# Patient Record
Sex: MALE | Race: WHITE | HISPANIC OR LATINO
[De-identification: names, ages, dates, MRNs, and addresses within clinical notes are randomized per-mention and may not be internally consistent; named-entity substitution may affect disease eponyms.]

---

## 2020-09-28 ENCOUNTER — APPOINTMENT (OUTPATIENT)
Dept: NEUROSURGERY | Facility: CLINIC | Age: 30
End: 2020-09-28
Payer: MEDICAID

## 2020-09-28 VITALS — TEMPERATURE: 98.2 F

## 2020-09-28 DIAGNOSIS — S02.91XA UNSPECIFIED FRACTURE OF SKULL, INITIAL ENCOUNTER FOR CLOSED FRACTURE: ICD-10-CM

## 2020-09-28 PROCEDURE — 99203 OFFICE O/P NEW LOW 30 MIN: CPT
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
## 2020-09-30 NOTE — PHYSICAL EXAM
[General Appearance - Alert] : alert [General Appearance - In No Acute Distress] : in no acute distress [Oriented To Time, Place, And Person] : oriented to person, place, and time [Impaired Insight] : insight and judgment were intact [Affect] : the affect was normal [Paraparesis (Lower Extremities)] : paraparesis in both legs [Non-ambulatory] : Non-ambulatory [Sclera] : the sclera and conjunctiva were normal [Outer Ear] : the ears and nose were normal in appearance [Hearing Threshold Finger Rub Not Wheeler] : hearing was normal [Neck Appearance] : the appearance of the neck was normal [Abdomen Tenderness] : non-tender [Abdomen Mass (___ Cm)] : no abdominal mass palpated [No Spinal Tenderness] : no spinal tenderness [Nail Clubbing] : no clubbing  or cyanosis of the fingernails [Involuntary Movements] : no involuntary movements were seen [Tremor] : no tremor present [FreeTextEntry6] : (+) RUE spasticity [FreeTextEntry1] : G tube in place
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
## 2020-12-02 ENCOUNTER — APPOINTMENT (OUTPATIENT)
Dept: CT IMAGING | Facility: CLINIC | Age: 30
End: 2020-12-02
Payer: MEDICAID

## 2020-12-02 ENCOUNTER — OUTPATIENT (OUTPATIENT)
Dept: OUTPATIENT SERVICES | Facility: HOSPITAL | Age: 30
LOS: 1 days | End: 2020-12-02
Payer: COMMERCIAL

## 2020-12-02 DIAGNOSIS — S06.5X9A TRAUMATIC SUBDURAL HEMORRHAGE WITH LOSS OF CONSCIOUSNESS OF UNSPECIFIED DURATION, INITIAL ENCOUNTER: ICD-10-CM

## 2020-12-02 PROCEDURE — 70450 CT HEAD/BRAIN W/O DYE: CPT | Mod: 26

## 2020-12-02 PROCEDURE — 70450 CT HEAD/BRAIN W/O DYE: CPT

## 2020-12-16 DIAGNOSIS — S06.5X9A TRAUMATIC SUBDURAL HEMORRHAGE WITH LOSS OF CONSCIOUSNESS OF UNSPECIFIED DURATION, INITIAL ENCOUNTER: ICD-10-CM

## 2021-02-04 ENCOUNTER — APPOINTMENT (OUTPATIENT)
Dept: PHYSICAL MEDICINE AND REHAB | Facility: CLINIC | Age: 31
End: 2021-02-04
Payer: MEDICAID

## 2021-02-04 VITALS
OXYGEN SATURATION: 100 % | DIASTOLIC BLOOD PRESSURE: 73 MMHG | HEIGHT: 69 IN | WEIGHT: 185 LBS | TEMPERATURE: 98.2 F | SYSTOLIC BLOOD PRESSURE: 122 MMHG | BODY MASS INDEX: 27.4 KG/M2 | HEART RATE: 71 BPM

## 2021-02-04 PROBLEM — S06.5X9A SUBDURAL HEMATOMA, POST-TRAUMATIC: Status: ACTIVE | Noted: 2020-09-28

## 2021-02-04 PROCEDURE — 99072 ADDL SUPL MATRL&STAF TM PHE: CPT

## 2021-02-04 PROCEDURE — 99205 OFFICE O/P NEW HI 60 MIN: CPT

## 2021-02-09 RX ORDER — ONABOTULINUMTOXINA 100 [USP'U]/1
100 INJECTION, POWDER, LYOPHILIZED, FOR SOLUTION INTRADERMAL; INTRAMUSCULAR
Qty: 3 | Refills: 0 | Status: ACTIVE | OUTPATIENT
Start: 2021-02-09

## 2021-02-25 ENCOUNTER — APPOINTMENT (OUTPATIENT)
Dept: PHYSICAL MEDICINE AND REHAB | Facility: CLINIC | Age: 31
End: 2021-02-25
Payer: MEDICAID

## 2021-02-25 VITALS
SYSTOLIC BLOOD PRESSURE: 122 MMHG | WEIGHT: 185 LBS | OXYGEN SATURATION: 99 % | HEIGHT: 69 IN | BODY MASS INDEX: 27.4 KG/M2 | TEMPERATURE: 99.5 F | HEART RATE: 74 BPM | DIASTOLIC BLOOD PRESSURE: 77 MMHG

## 2021-02-25 PROCEDURE — 95874 GUIDE NERV DESTR NEEDLE EMG: CPT

## 2021-02-25 PROCEDURE — 99072 ADDL SUPL MATRL&STAF TM PHE: CPT

## 2021-02-25 PROCEDURE — 64643 CHEMODENERV 1 EXTREM 1-4 EA: CPT

## 2021-02-25 PROCEDURE — 64644 CHEMODENERV 1 EXTREM 5/> MUS: CPT

## 2021-02-28 NOTE — PROCEDURE
[Consent] : consent was given by patient or guardian [Site Verification] : the injection site was verified [Post-Injection Instructions Provided] : post-injection instructions were provided [] : EMG Guidance was used during the procedure [Total Units: ___] : [unfilled] units [Total Vials: ___] : [unfilled] vials were used [Total Waste: ___] : with [unfilled] wasted [TWNoteComboBox1] : Brachialis [TWNoteComboBox2] : 50 Units [de-identified] : Brachioradialis [de-identified] : 50 Units [de-identified] : Flexor Carpi Radialis [de-identified] : 25 Units [de-identified] : Flexor Digitorum Superficialis [de-identified] : 30 Units [de-identified] : Pronator Teres [de-identified] : 15 Units [de-identified] : Quadriceps [de-identified] : 15 Units [de-identified] : Soleus [de-identified] : 60 Units [de-identified] : Medial Gastrocnemius [de-identified] : 55 Units

## 2021-02-28 NOTE — ASSESSMENT
[FreeTextEntry1] : Botox x3- Lot # [id number]   Exp: 8/2023. \par Tolerated Botox procedure well .  Post discharge instructions provided.  \par \par Patient needs home therapy to improve gait, mobility and ADL impairments.  Face to Face completed and referral to [facility] for home care therapy initiated. \par \par Will contact OT Remedios Gleason at [facility] to get in contact with pt's sister or brother to set up appointment for  clinic.

## 2021-02-28 NOTE — PHYSICAL EXAM
[FreeTextEntry1] : Constitutional: Alert, awake, no acute distress\par HEENT: EOMI, NC/AT, neck supple\par Cardiovascular: All extremities warm and well perfused\par Pulmonary: No respiratory distress, normal chest wall expansion\par Gastrointestinal: No abdominal distention\par Extr: right KAFO\par \par Neuro:\par AAOx2,\par Recall 2/3 spontaneously, 3/3 \par Attention--able to complete days of week backward, \par Executive function--able to complete Trails [place] \par \par CN: no nystagmus, visual fields intact, EOMI, right facial weakness, sensation slightly impaired, shoulder nick impaired, tongue midline\par Motor-- right shoulder 2/5 posterior deltoid, 1/5 anterior deltoid, elbow flexion- 4/5, extension 4/5, wrist 2/5, Finger flexion 3/5 ; right LE hip 2/5, knee extensors 5/5, flexors 1/5, PF/DF 0/5. \par Left UE and LE 5/5\par Sens impaired on right\par Coordination impaired on right, \par Tone: right elbow flexors 2/4, wrist 1+/4, fingers--PIP 2/4, Quads-- 1+, PF 3/4\par \par Gait-- ambulates with Hemiwalker on left-- needs assistance of brother to advance right LE-- his brother releases lock on KAFO so right LE can advance with assistance. Ambulates with Mod A. \par \par No pain with PROM of right shoulder\par

## 2021-02-28 NOTE — REVIEW OF SYSTEMS
[Joint Stiffness] : joint stiffness [Muscle Pain] : muscle pain [Muscle Weakness] : muscle weakness [Difficulty Walking] : difficulty walking [Negative] : Heme/Lymph
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
## 2021-03-30 ENCOUNTER — APPOINTMENT (OUTPATIENT)
Dept: PHYSICAL MEDICINE AND REHAB | Facility: CLINIC | Age: 31
End: 2021-03-30

## 2021-05-24 ENCOUNTER — APPOINTMENT (OUTPATIENT)
Dept: NEUROSURGERY | Facility: CLINIC | Age: 31
End: 2021-05-24
Payer: MEDICAID

## 2021-05-24 VITALS
DIASTOLIC BLOOD PRESSURE: 82 MMHG | BODY MASS INDEX: 29.62 KG/M2 | WEIGHT: 200 LBS | HEIGHT: 69 IN | SYSTOLIC BLOOD PRESSURE: 130 MMHG | HEART RATE: 74 BPM

## 2021-05-24 PROCEDURE — 99213 OFFICE O/P EST LOW 20 MIN: CPT

## 2021-05-24 PROCEDURE — 99072 ADDL SUPL MATRL&STAF TM PHE: CPT
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
## 2021-05-24 NOTE — PHYSICAL EXAM
[General Appearance - Alert] : alert [General Appearance - In No Acute Distress] : in no acute distress [Oriented To Time, Place, And Person] : oriented to person, place, and time [Cranial Nerves Optic (II)] : visual acuity intact bilaterally,  pupils equal round and reactive to light [Cranial Nerves Oculomotor (III)] : extraocular motion intact [Cranial Nerves Trigeminal (V)] : facial sensation intact symmetrically [Cranial Nerves Facial (VII)] : face symmetrical [Cranial Nerves Vestibulocochlear (VIII)] : hearing was intact bilaterally [Cranial Nerves Accessory (XI - Cranial And Spinal)] : head turning and shoulder shrug symmetric [Cranial Nerves Hypoglossal (XII)] : there was no tongue deviation with protrusion [Sensation Tactile Decrease] : light touch was intact [FreeTextEntry5] : RUE/RLE 5/5, moving LUE/LLE, spastic
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
## 2021-05-27 ENCOUNTER — APPOINTMENT (OUTPATIENT)
Dept: PHYSICAL MEDICINE AND REHAB | Facility: CLINIC | Age: 31
End: 2021-05-27
Payer: MEDICAID

## 2021-05-27 VITALS
WEIGHT: 200 LBS | BODY MASS INDEX: 29.62 KG/M2 | SYSTOLIC BLOOD PRESSURE: 138 MMHG | HEIGHT: 69 IN | DIASTOLIC BLOOD PRESSURE: 84 MMHG | HEART RATE: 80 BPM | OXYGEN SATURATION: 98 %

## 2021-05-27 PROCEDURE — 99072 ADDL SUPL MATRL&STAF TM PHE: CPT

## 2021-05-27 PROCEDURE — 64643 CHEMODENERV 1 EXTREM 1-4 EA: CPT

## 2021-05-27 PROCEDURE — 95874 GUIDE NERV DESTR NEEDLE EMG: CPT

## 2021-05-27 PROCEDURE — 64642 CHEMODENERV 1 EXTREMITY 1-4: CPT

## 2021-06-02 ENCOUNTER — OUTPATIENT (OUTPATIENT)
Dept: OUTPATIENT SERVICES | Facility: HOSPITAL | Age: 31
LOS: 1 days | End: 2021-06-02
Payer: MEDICAID

## 2021-06-02 VITALS
RESPIRATION RATE: 18 BRPM | HEIGHT: 68 IN | HEART RATE: 63 BPM | SYSTOLIC BLOOD PRESSURE: 118 MMHG | TEMPERATURE: 98 F | DIASTOLIC BLOOD PRESSURE: 80 MMHG | WEIGHT: 199.96 LBS | OXYGEN SATURATION: 98 %

## 2021-06-02 DIAGNOSIS — Z93.1 GASTROSTOMY STATUS: Chronic | ICD-10-CM

## 2021-06-02 DIAGNOSIS — I10 ESSENTIAL (PRIMARY) HYPERTENSION: ICD-10-CM

## 2021-06-02 DIAGNOSIS — S02.91XA UNSPECIFIED FRACTURE OF SKULL, INITIAL ENCOUNTER FOR CLOSED FRACTURE: ICD-10-CM

## 2021-06-02 DIAGNOSIS — S06.9X9A UNSPECIFIED INTRACRANIAL INJURY WITH LOSS OF CONSCIOUSNESS OF UNSPECIFIED DURATION, INITIAL ENCOUNTER: Chronic | ICD-10-CM

## 2021-06-02 DIAGNOSIS — Z98.890 OTHER SPECIFIED POSTPROCEDURAL STATES: Chronic | ICD-10-CM

## 2021-06-02 DIAGNOSIS — Z01.812 ENCOUNTER FOR PREPROCEDURAL LABORATORY EXAMINATION: ICD-10-CM

## 2021-06-02 LAB
ANION GAP SERPL CALC-SCNC: 12 MMOL/L — SIGNIFICANT CHANGE UP (ref 7–14)
BLD GP AB SCN SERPL QL: NEGATIVE — SIGNIFICANT CHANGE UP
BUN SERPL-MCNC: 13 MG/DL — SIGNIFICANT CHANGE UP (ref 7–23)
CALCIUM SERPL-MCNC: 9.8 MG/DL — SIGNIFICANT CHANGE UP (ref 8.4–10.5)
CHLORIDE SERPL-SCNC: 102 MMOL/L — SIGNIFICANT CHANGE UP (ref 98–107)
CO2 SERPL-SCNC: 25 MMOL/L — SIGNIFICANT CHANGE UP (ref 22–31)
CREAT SERPL-MCNC: 0.85 MG/DL — SIGNIFICANT CHANGE UP (ref 0.5–1.3)
GLUCOSE SERPL-MCNC: 78 MG/DL — SIGNIFICANT CHANGE UP (ref 70–99)
HCT VFR BLD CALC: 42.8 % — SIGNIFICANT CHANGE UP (ref 39–50)
HGB BLD-MCNC: 15.6 G/DL — SIGNIFICANT CHANGE UP (ref 13–17)
MCHC RBC-ENTMCNC: 33.5 PG — SIGNIFICANT CHANGE UP (ref 27–34)
MCHC RBC-ENTMCNC: 36.4 GM/DL — HIGH (ref 32–36)
MCV RBC AUTO: 91.8 FL — SIGNIFICANT CHANGE UP (ref 80–100)
NRBC # BLD: 0 /100 WBCS — SIGNIFICANT CHANGE UP
NRBC # FLD: 0 K/UL — SIGNIFICANT CHANGE UP
PLATELET # BLD AUTO: 252 K/UL — SIGNIFICANT CHANGE UP (ref 150–400)
POTASSIUM SERPL-MCNC: 3.9 MMOL/L — SIGNIFICANT CHANGE UP (ref 3.5–5.3)
POTASSIUM SERPL-SCNC: 3.9 MMOL/L — SIGNIFICANT CHANGE UP (ref 3.5–5.3)
RBC # BLD: 4.66 M/UL — SIGNIFICANT CHANGE UP (ref 4.2–5.8)
RBC # FLD: 11.3 % — SIGNIFICANT CHANGE UP (ref 10.3–14.5)
RH IG SCN BLD-IMP: POSITIVE — SIGNIFICANT CHANGE UP
SODIUM SERPL-SCNC: 139 MMOL/L — SIGNIFICANT CHANGE UP (ref 135–145)
WBC # BLD: 6 K/UL — SIGNIFICANT CHANGE UP (ref 3.8–10.5)
WBC # FLD AUTO: 6 K/UL — SIGNIFICANT CHANGE UP (ref 3.8–10.5)

## 2021-06-02 PROCEDURE — 93010 ELECTROCARDIOGRAM REPORT: CPT

## 2021-06-02 NOTE — H&P PST ADULT - NSICDXPASTSURGICALHX_GEN_ALL_CORE_FT
PAST SURGICAL HISTORY:  H/O tracheostomy 2020 & closure    S/P percutaneous endoscopic gastrostomy (PEG) tube placement 3/10/2020 & removal    Traumatic brain injury left frontoparietal craniectomy
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
## 2021-06-02 NOTE — H&P PST ADULT - NSICDXPROBLEM_GEN_ALL_CORE_FT
PROBLEM DIAGNOSES  Problem: Encounter for preprocedure screening laboratory testing for COVID-19  Assessment and Plan: Pt instructed to contact surgeon office to arrange to have covid testing with 72 hrs of this surgery    Problem: Hypertension  Assessment and Plan: Pt state self d/c b/p medication  approx one month ago   Normal b/p reading at this visit  medical eval requested - <4 adl's, house chores wheelchair bound, need assistance with adl's    Problem: Unspecified fracture of skull, initial encounter for closed fracture  Assessment and Plan: Scheduled for left crainoplasty on 6/4/2021  Written & verbal preop instructions, gi prophylaxis   Pt verbalized good understanding.    unable to wgt in PST, please wgt on admission.  Stated wgt documented.

## 2021-06-02 NOTE — H&P PST ADULT - NSANTHOSAYNRD_GEN_A_CORE
No. GIDEON screening performed.  STOP BANG Legend: 0-2 = LOW Risk; 3-4 = INTERMEDIATE Risk; 5-8 = HIGH Risk

## 2021-06-02 NOTE — H&P PST ADULT - NEGATIVE GENERAL GENITOURINARY SYMPTOMS
no flank pain L/no flank pain R/no bladder infections/no dysuria/no urinary hesitancy/normal urinary frequency
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
## 2021-06-02 NOTE — H&P PST ADULT - LAST CARDIAC ANGIOGRAM/IMAGING
Called and spoke with patient nurse in infusion adam Dela Cruz - advised her that Dr. Montoya advised we would decrease his eliquis to 2.5mg big but that for the first 7 days to only take it daily and if no bleeding to increase to BID at that time.     denies

## 2021-06-03 RX ORDER — DOCUSATE SODIUM 100 MG
0 CAPSULE ORAL
Qty: 0 | Refills: 0 | DISCHARGE

## 2021-06-04 ENCOUNTER — APPOINTMENT (OUTPATIENT)
Dept: NEUROSURGERY | Facility: HOSPITAL | Age: 31
End: 2021-06-04

## 2021-06-15 PROBLEM — S06.9X9A UNSPECIFIED INTRACRANIAL INJURY WITH LOSS OF CONSCIOUSNESS OF UNSPECIFIED DURATION, INITIAL ENCOUNTER: Chronic | Status: ACTIVE | Noted: 2021-06-02

## 2021-06-15 PROBLEM — I10 ESSENTIAL (PRIMARY) HYPERTENSION: Chronic | Status: ACTIVE | Noted: 2021-06-02

## 2021-06-15 PROBLEM — E66.9 OBESITY, UNSPECIFIED: Chronic | Status: ACTIVE | Noted: 2021-06-02

## 2021-06-15 PROBLEM — G81.91 HEMIPLEGIA, UNSPECIFIED AFFECTING RIGHT DOMINANT SIDE: Chronic | Status: ACTIVE | Noted: 2021-06-02

## 2021-06-17 ENCOUNTER — TRANSCRIPTION ENCOUNTER (OUTPATIENT)
Age: 31
End: 2021-06-17

## 2021-06-17 NOTE — ASU PATIENT PROFILE, ADULT - PSH
H/O tracheostomy  2020 & closure  S/P percutaneous endoscopic gastrostomy (PEG) tube placement  3/10/2020 & removal  Traumatic brain injury  left frontoparietal craniectomy
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
## 2021-06-18 ENCOUNTER — APPOINTMENT (OUTPATIENT)
Dept: NEUROSURGERY | Facility: HOSPITAL | Age: 31
End: 2021-06-18
Payer: MEDICAID

## 2021-06-18 ENCOUNTER — INPATIENT (INPATIENT)
Facility: HOSPITAL | Age: 31
LOS: 8 days | Discharge: NURSING, REHAB OR LONG-TERM CARE | End: 2021-06-27
Attending: NEUROLOGICAL SURGERY | Admitting: NEUROLOGICAL SURGERY
Payer: MEDICAID

## 2021-06-18 VITALS
DIASTOLIC BLOOD PRESSURE: 72 MMHG | HEART RATE: 79 BPM | OXYGEN SATURATION: 97 % | RESPIRATION RATE: 16 BRPM | WEIGHT: 199.96 LBS | HEIGHT: 68 IN | SYSTOLIC BLOOD PRESSURE: 126 MMHG | TEMPERATURE: 99 F

## 2021-06-18 DIAGNOSIS — Z93.1 GASTROSTOMY STATUS: Chronic | ICD-10-CM

## 2021-06-18 DIAGNOSIS — S06.9X9A UNSPECIFIED INTRACRANIAL INJURY WITH LOSS OF CONSCIOUSNESS OF UNSPECIFIED DURATION, INITIAL ENCOUNTER: Chronic | ICD-10-CM

## 2021-06-18 DIAGNOSIS — S02.91XA UNSPECIFIED FRACTURE OF SKULL, INITIAL ENCOUNTER FOR CLOSED FRACTURE: ICD-10-CM

## 2021-06-18 DIAGNOSIS — Z98.890 OTHER SPECIFIED POSTPROCEDURAL STATES: Chronic | ICD-10-CM

## 2021-06-18 LAB
ANION GAP SERPL CALC-SCNC: 11 MMOL/L — SIGNIFICANT CHANGE UP (ref 7–14)
BUN SERPL-MCNC: 12 MG/DL — SIGNIFICANT CHANGE UP (ref 7–23)
CALCIUM SERPL-MCNC: 9.1 MG/DL — SIGNIFICANT CHANGE UP (ref 8.4–10.5)
CHLORIDE SERPL-SCNC: 105 MMOL/L — SIGNIFICANT CHANGE UP (ref 98–107)
CO2 SERPL-SCNC: 23 MMOL/L — SIGNIFICANT CHANGE UP (ref 22–31)
CREAT SERPL-MCNC: 0.93 MG/DL — SIGNIFICANT CHANGE UP (ref 0.5–1.3)
GLUCOSE SERPL-MCNC: 106 MG/DL — HIGH (ref 70–99)
HCT VFR BLD CALC: 38.1 % — LOW (ref 39–50)
HGB BLD-MCNC: 13.7 G/DL — SIGNIFICANT CHANGE UP (ref 13–17)
MAGNESIUM SERPL-MCNC: 1.7 MG/DL — SIGNIFICANT CHANGE UP (ref 1.6–2.6)
MCHC RBC-ENTMCNC: 33.1 PG — SIGNIFICANT CHANGE UP (ref 27–34)
MCHC RBC-ENTMCNC: 36 GM/DL — SIGNIFICANT CHANGE UP (ref 32–36)
MCV RBC AUTO: 92 FL — SIGNIFICANT CHANGE UP (ref 80–100)
NRBC # BLD: 0 /100 WBCS — SIGNIFICANT CHANGE UP
NRBC # FLD: 0 K/UL — SIGNIFICANT CHANGE UP
PHOSPHATE SERPL-MCNC: 4.1 MG/DL — SIGNIFICANT CHANGE UP (ref 2.5–4.5)
PLATELET # BLD AUTO: 215 K/UL — SIGNIFICANT CHANGE UP (ref 150–400)
POTASSIUM SERPL-MCNC: 4.5 MMOL/L — SIGNIFICANT CHANGE UP (ref 3.5–5.3)
POTASSIUM SERPL-SCNC: 4.5 MMOL/L — SIGNIFICANT CHANGE UP (ref 3.5–5.3)
RBC # BLD: 4.14 M/UL — LOW (ref 4.2–5.8)
RBC # FLD: 11.4 % — SIGNIFICANT CHANGE UP (ref 10.3–14.5)
SODIUM SERPL-SCNC: 139 MMOL/L — SIGNIFICANT CHANGE UP (ref 135–145)
WBC # BLD: 11.84 K/UL — HIGH (ref 3.8–10.5)
WBC # FLD AUTO: 11.84 K/UL — HIGH (ref 3.8–10.5)

## 2021-06-18 PROCEDURE — 62141 CRNOP SKULL DEFECT>5 CM DIAM: CPT

## 2021-06-18 PROCEDURE — 99253 IP/OBS CNSLTJ NEW/EST LOW 45: CPT

## 2021-06-18 RX ORDER — LEVETIRACETAM 250 MG/1
500 TABLET, FILM COATED ORAL EVERY 12 HOURS
Refills: 0 | Status: DISCONTINUED | OUTPATIENT
Start: 2021-06-18 | End: 2021-06-27

## 2021-06-18 RX ORDER — FAMOTIDINE 10 MG/ML
20 INJECTION INTRAVENOUS EVERY 12 HOURS
Refills: 0 | Status: DISCONTINUED | OUTPATIENT
Start: 2021-06-18 | End: 2021-06-27

## 2021-06-18 RX ORDER — OXYCODONE HYDROCHLORIDE 5 MG/1
5 TABLET ORAL EVERY 6 HOURS
Refills: 0 | Status: DISCONTINUED | OUTPATIENT
Start: 2021-06-18 | End: 2021-06-21

## 2021-06-18 RX ORDER — SODIUM CHLORIDE 9 MG/ML
1000 INJECTION INTRAMUSCULAR; INTRAVENOUS; SUBCUTANEOUS
Refills: 0 | Status: DISCONTINUED | OUTPATIENT
Start: 2021-06-18 | End: 2021-06-19

## 2021-06-18 RX ORDER — ACETAMINOPHEN 500 MG
650 TABLET ORAL EVERY 6 HOURS
Refills: 0 | Status: DISCONTINUED | OUTPATIENT
Start: 2021-06-18 | End: 2021-06-27

## 2021-06-18 RX ORDER — HYDROMORPHONE HYDROCHLORIDE 2 MG/ML
0.5 INJECTION INTRAMUSCULAR; INTRAVENOUS; SUBCUTANEOUS
Refills: 0 | Status: DISCONTINUED | OUTPATIENT
Start: 2021-06-18 | End: 2021-06-19

## 2021-06-18 RX ORDER — ONDANSETRON 8 MG/1
4 TABLET, FILM COATED ORAL EVERY 6 HOURS
Refills: 0 | Status: DISCONTINUED | OUTPATIENT
Start: 2021-06-18 | End: 2021-06-27

## 2021-06-18 RX ORDER — HYDROMORPHONE HYDROCHLORIDE 2 MG/ML
1 INJECTION INTRAMUSCULAR; INTRAVENOUS; SUBCUTANEOUS
Refills: 0 | Status: DISCONTINUED | OUTPATIENT
Start: 2021-06-18 | End: 2021-06-19

## 2021-06-18 RX ORDER — ONDANSETRON 8 MG/1
4 TABLET, FILM COATED ORAL ONCE
Refills: 0 | Status: COMPLETED | OUTPATIENT
Start: 2021-06-18 | End: 2021-06-19

## 2021-06-18 RX ORDER — CEFAZOLIN SODIUM 1 G
2000 VIAL (EA) INJECTION EVERY 8 HOURS
Refills: 0 | Status: COMPLETED | OUTPATIENT
Start: 2021-06-18 | End: 2021-06-20

## 2021-06-18 RX ORDER — MAGNESIUM SULFATE 500 MG/ML
1 VIAL (ML) INJECTION ONCE
Refills: 0 | Status: COMPLETED | OUTPATIENT
Start: 2021-06-18 | End: 2021-06-18

## 2021-06-18 RX ADMIN — Medication 100 MILLIGRAM(S): at 17:45

## 2021-06-18 RX ADMIN — OXYCODONE HYDROCHLORIDE 5 MILLIGRAM(S): 5 TABLET ORAL at 23:38

## 2021-06-18 RX ADMIN — LEVETIRACETAM 500 MILLIGRAM(S): 250 TABLET, FILM COATED ORAL at 17:45

## 2021-06-18 RX ADMIN — SODIUM CHLORIDE 75 MILLILITER(S): 9 INJECTION INTRAMUSCULAR; INTRAVENOUS; SUBCUTANEOUS at 12:40

## 2021-06-18 RX ADMIN — OXYCODONE HYDROCHLORIDE 5 MILLIGRAM(S): 5 TABLET ORAL at 17:45

## 2021-06-18 RX ADMIN — FAMOTIDINE 20 MILLIGRAM(S): 10 INJECTION INTRAVENOUS at 17:46

## 2021-06-18 RX ADMIN — OXYCODONE HYDROCHLORIDE 5 MILLIGRAM(S): 5 TABLET ORAL at 18:30

## 2021-06-18 RX ADMIN — Medication 100 GRAM(S): at 18:30
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
## 2021-06-18 NOTE — CONSULT NOTE ADULT - ASSESSMENT
29y/o male with a history of traumatic brain injury and is s/p left frontoparietal craniectomy. Pt currently wheelchair bound with right hemiparesis.  He presents for scheduled left cranioplasty. SICU consulted for q 1 hour neurochecks    PLAN:  ***  Neurologic:   - Tylenol for pain  Respiratory:   - Stable monitor sats q 1 hour  Cardiovascular:   - Stable monitor vitals q 1 hour  Gastrointestinal/Nutrition:   - Advance diet as tolerated  Renal/Genitourinary:   - IVF until tolerates   Hematologic:   - ???????  - SCDs for DVT PPX  Infectious Disease:   - As per neuro surg  Tubes/Lines/Drains:   Endocrine:   Disposition: PACU  --------------------------------------------------------------------------------------  Critical Care Diagnoses: Cranioplasty, Right hemiparesis  29y/o male with a history of traumatic brain injury and is s/p left frontoparietal craniectomy. Pt currently wheelchair bound with right hemiparesis.  He presents for scheduled left cranioplasty.  SICU consulted for q 1 hour neurochecks    PLAN:    Neurologic:   - Tylenol and oxy PRN for pain  - Keppra 500 BID  - Q1H neuro checks    Respiratory:   - Stable monitor sats q 1 hour    Cardiovascular:   - Stable monitor vitals q 1 hour    Gastrointestinal/Nutrition:   - Advance diet as tolerated    Renal/Genitourinary:   - IVF until tolerates     Hematologic:   - no active issues  - SCDs for DVT PPX    Infectious Disease:   -Ancef q 24 h    Tubes/Lines/Drains:     Endocrine: no active issues  Disposition: PACU  --------------------------------------------------------------------------------------  Critical Care Diagnoses: Cranioplasty, Right hemiparesis  31y/o male with a history of traumatic brain injury and is s/p left frontoparietal craniectomy. Pt currently wheelchair bound with right hemiparesis.  He presents for scheduled left cranioplasty.  SICU consulted for q 1 hour neurochecks    PLAN:    Neurologic:   - Tylenol and oxy PRN for pain  - Keppra 500 BID  - Q1H neuro checks    Respiratory:   - Stable monitor sats q 1 hour    Cardiovascular:   - Stable monitor vitals q 1 hour    Gastrointestinal/Nutrition:   - Advance diet as tolerated  - Pepcid    Renal/Genitourinary:   - IVF until tolerates     Hematologic:   - no active issues  - SCDs for DVT PPX    Infectious Disease:   -Ancef q 24 h    Tubes/Lines/Drains:     Endocrine: no active issues  Disposition: PACU  --------------------------------------------------------------------------------------  Critical Care Diagnoses: Cranioplasty, Right hemiparesis

## 2021-06-18 NOTE — CHART NOTE - NSCHARTNOTEFT_GEN_A_CORE
CAPRINI SCORE [CLOT]    AGE RELATED RISK FACTORS                                                       MOBILITY RELATED FACTORS  [ ] Age 41-60 years                                            (1 Point)                  [ ] Bed rest                                                        (1 Point)  [ ] Age: 61-74 years                                           (2 Points)                 [ ] Plaster cast                                                   (2 Points)  [ ] Age= 75 years                                              (3 Points)                 [ ] Bed bound for more than 72 hours                 (2 Points)    DISEASE RELATED RISK FACTORS                                               GENDER SPECIFIC FACTORS  [ ] Edema in the lower extremities                       (1 Point)                  [ ] Pregnancy                                                     (1 Point)  [ ] Varicose veins                                               (1 Point)                  [ ] Post-partum < 6 weeks                                   (1 Point)             [x] BMI > 25 Kg/m2                                            (1 Point)                  [ ] Hormonal therapy  or oral contraception          (1 Point)                 [ ] Sepsis (in the previous month)                        (1 Point)                  [ ] History of pregnancy complications                 (1 point)  [ ] Pneumonia or serious lung disease                                               [ ] Unexplained or recurrent                     (1 Point)           (in the previous month)                               (1 Point)  [ ] Abnormal pulmonary function test                     (1 Point)                 SURGERY RELATED RISK FACTORS  [ ] Acute myocardial infarction                              (1 Point)                 [ ]  Section                                             (1 Point)  [ ] Congestive heart failure (in the previous month)  (1 Point)               [ ] Minor surgery                                                  (1 Point)   [ ] Inflammatory bowel disease                             (1 Point)                 [ ] Arthroscopic surgery                                        (2 Points)  [ ] Central venous access                                      (2 Points)                [x] General surgery lasting more than 45 minutes   (2 Points)       [ ] Stroke (in the previous month)                          (5 Points)               [ ] Elective arthroplasty                                         (5 Points)                                                                                                                                               HEMATOLOGY RELATED FACTORS                                                 TRAUMA RELATED RISK FACTORS  [ ] Prior episodes of VTE                                     (3 Points)                [ ] Fracture of the hip, pelvis, or leg                       (5 Points)  [ ] Positive family history for VTE                         (3 Points)                 [ ] Acute spinal cord injury (in the previous month)  (5 Points)  [ ] Prothrombin 64784 A                                     (3 Points)                 [ ] Paralysis  (less than 1 month)                             (5 Points)  [ ] Factor V Leiden                                             (3 Points)                  [ ] Multiple Trauma within 1 month                        (5 Points)  [ ] Lupus anticoagulants                                     (3 Points)                                                           [ ] Anticardiolipin antibodies                               (3 Points)                                                       [ ] High homocysteine in the blood                      (3 Points)                                             [ ] Other congenital or acquired thrombophilia      (3 Points)                                                [ ] Heparin induced thrombocytopenia                  (3 Points)                                          Total Score [    3      ]    Caprini Score 0 - 2:  Low Risk, No VTE Prophylaxis required for most patients, encourage ambulation  Caprini Score 3 - 6:  At Risk, pharmacologic VTE prophylaxis is indicated for most patients (in the absence of a contraindication)  Caprini Score Greater than or = 7:  High Risk, pharmacologic VTE prophylaxis is indicated for most patients (in the absence of a contraindication)

## 2021-06-19 DIAGNOSIS — Z98.890 OTHER SPECIFIED POSTPROCEDURAL STATES: ICD-10-CM

## 2021-06-19 LAB
ANION GAP SERPL CALC-SCNC: 12 MMOL/L — SIGNIFICANT CHANGE UP (ref 7–14)
BUN SERPL-MCNC: 7 MG/DL — SIGNIFICANT CHANGE UP (ref 7–23)
CALCIUM SERPL-MCNC: 8.8 MG/DL — SIGNIFICANT CHANGE UP (ref 8.4–10.5)
CHLORIDE SERPL-SCNC: 104 MMOL/L — SIGNIFICANT CHANGE UP (ref 98–107)
CO2 SERPL-SCNC: 23 MMOL/L — SIGNIFICANT CHANGE UP (ref 22–31)
CREAT SERPL-MCNC: 0.85 MG/DL — SIGNIFICANT CHANGE UP (ref 0.5–1.3)
GLUCOSE SERPL-MCNC: 99 MG/DL — SIGNIFICANT CHANGE UP (ref 70–99)
HCT VFR BLD CALC: 35.3 % — LOW (ref 39–50)
HGB BLD-MCNC: 12.4 G/DL — LOW (ref 13–17)
MAGNESIUM SERPL-MCNC: 1.7 MG/DL — SIGNIFICANT CHANGE UP (ref 1.6–2.6)
MCHC RBC-ENTMCNC: 33.3 PG — SIGNIFICANT CHANGE UP (ref 27–34)
MCHC RBC-ENTMCNC: 35.1 GM/DL — SIGNIFICANT CHANGE UP (ref 32–36)
MCV RBC AUTO: 94.9 FL — SIGNIFICANT CHANGE UP (ref 80–100)
NRBC # BLD: 0 /100 WBCS — SIGNIFICANT CHANGE UP
NRBC # FLD: 0 K/UL — SIGNIFICANT CHANGE UP
PHOSPHATE SERPL-MCNC: 3.8 MG/DL — SIGNIFICANT CHANGE UP (ref 2.5–4.5)
PLATELET # BLD AUTO: 205 K/UL — SIGNIFICANT CHANGE UP (ref 150–400)
POTASSIUM SERPL-MCNC: 4.1 MMOL/L — SIGNIFICANT CHANGE UP (ref 3.5–5.3)
POTASSIUM SERPL-SCNC: 4.1 MMOL/L — SIGNIFICANT CHANGE UP (ref 3.5–5.3)
RBC # BLD: 3.72 M/UL — LOW (ref 4.2–5.8)
RBC # FLD: 11.4 % — SIGNIFICANT CHANGE UP (ref 10.3–14.5)
SODIUM SERPL-SCNC: 139 MMOL/L — SIGNIFICANT CHANGE UP (ref 135–145)
WBC # BLD: 8.71 K/UL — SIGNIFICANT CHANGE UP (ref 3.8–10.5)
WBC # FLD AUTO: 8.71 K/UL — SIGNIFICANT CHANGE UP (ref 3.8–10.5)

## 2021-06-19 PROCEDURE — 99232 SBSQ HOSP IP/OBS MODERATE 35: CPT

## 2021-06-19 PROCEDURE — 70450 CT HEAD/BRAIN W/O DYE: CPT | Mod: 26

## 2021-06-19 RX ORDER — ENOXAPARIN SODIUM 100 MG/ML
40 INJECTION SUBCUTANEOUS AT BEDTIME
Refills: 0 | Status: DISCONTINUED | OUTPATIENT
Start: 2021-06-20 | End: 2021-06-27

## 2021-06-19 RX ADMIN — Medication 100 MILLIGRAM(S): at 01:25

## 2021-06-19 RX ADMIN — Medication 650 MILLIGRAM(S): at 01:55

## 2021-06-19 RX ADMIN — Medication 650 MILLIGRAM(S): at 01:25

## 2021-06-19 RX ADMIN — Medication 650 MILLIGRAM(S): at 12:01

## 2021-06-19 RX ADMIN — OXYCODONE HYDROCHLORIDE 5 MILLIGRAM(S): 5 TABLET ORAL at 13:05

## 2021-06-19 RX ADMIN — HYDROMORPHONE HYDROCHLORIDE 1 MILLIGRAM(S): 2 INJECTION INTRAMUSCULAR; INTRAVENOUS; SUBCUTANEOUS at 13:05

## 2021-06-19 RX ADMIN — OXYCODONE HYDROCHLORIDE 5 MILLIGRAM(S): 5 TABLET ORAL at 12:30

## 2021-06-19 RX ADMIN — FAMOTIDINE 20 MILLIGRAM(S): 10 INJECTION INTRAVENOUS at 06:00

## 2021-06-19 RX ADMIN — Medication 650 MILLIGRAM(S): at 23:45

## 2021-06-19 RX ADMIN — LEVETIRACETAM 500 MILLIGRAM(S): 250 TABLET, FILM COATED ORAL at 06:00

## 2021-06-19 RX ADMIN — Medication 100 MILLIGRAM(S): at 17:34

## 2021-06-19 RX ADMIN — LEVETIRACETAM 500 MILLIGRAM(S): 250 TABLET, FILM COATED ORAL at 18:53

## 2021-06-19 RX ADMIN — Medication 100 MILLIGRAM(S): at 09:09

## 2021-06-19 RX ADMIN — Medication 650 MILLIGRAM(S): at 22:45

## 2021-06-19 RX ADMIN — FAMOTIDINE 20 MILLIGRAM(S): 10 INJECTION INTRAVENOUS at 18:53

## 2021-06-19 RX ADMIN — Medication 650 MILLIGRAM(S): at 11:05

## 2021-06-19 RX ADMIN — HYDROMORPHONE HYDROCHLORIDE 1 MILLIGRAM(S): 2 INJECTION INTRAMUSCULAR; INTRAVENOUS; SUBCUTANEOUS at 12:29

## 2021-06-19 RX ADMIN — OXYCODONE HYDROCHLORIDE 5 MILLIGRAM(S): 5 TABLET ORAL at 18:59

## 2021-06-19 RX ADMIN — ONDANSETRON 4 MILLIGRAM(S): 8 TABLET, FILM COATED ORAL at 12:30

## 2021-06-19 NOTE — PROGRESS NOTE ADULT - ATTENDING COMMENTS
I agree with the above history, physical, and plan, which I have reviewed and edited where appropriate.  I agree with notes/assessment and detailed interval history of the health care providers on my service.  The patient is in SICU with diagnosis mentioned in the note.    The SICU team has a constant risk benefit analyzes discussion and coordinating care with the primary team, all consultants, House Staff and PA's.  I was physically present for the key portions of the evaluation and management (E/M) service provided.  29y/o male with a history of traumatic brain injury sp left cranioplasty in SICU for q 1 hour neurochecks  I have personally examined the patient, reviewed data and laboratory tests/x-rays and all pertinent electronic images.    Exam: Normal, NAD, alert, oriented x 3, right hemiparesis, weakness of right and lower ext, slurred speech  RESPIRATORY  Exam: Lungs clear   CARDIOVASCULAR  Exam:  RR  GI/NUTRITION  Exam: Abdomen soft, Non-tender, Non-distended.      The plan is specified below:  Neurologic:   - Tylenol and oxy PRN for pain  - Keppra 500 BID  Respiratory:   - RA  Cardiovascular:   - Stable   Gastrointestinal/Nutrition:   - Advance diet - Pepcid  Renal/Genitourinary:   - IVF lock    Hematologic:   - SCDs for DVT PPX    Infectious Disease:   -Ancef       Endocrine: no active issues  Disposition: PACU    Critical Care Diagnoses: sp Cranioplasty, Right hemiparesis

## 2021-06-20 ENCOUNTER — TRANSCRIPTION ENCOUNTER (OUTPATIENT)
Age: 31
End: 2021-06-20

## 2021-06-20 RX ORDER — OXYCODONE HYDROCHLORIDE 5 MG/1
1 TABLET ORAL
Qty: 20 | Refills: 0
Start: 2021-06-20 | End: 2021-06-24

## 2021-06-20 RX ORDER — LEVETIRACETAM 250 MG/1
1 TABLET, FILM COATED ORAL
Qty: 14 | Refills: 0
Start: 2021-06-20 | End: 2021-06-26

## 2021-06-20 RX ADMIN — Medication 100 MILLIGRAM(S): at 09:55

## 2021-06-20 RX ADMIN — LEVETIRACETAM 500 MILLIGRAM(S): 250 TABLET, FILM COATED ORAL at 06:00

## 2021-06-20 RX ADMIN — Medication 650 MILLIGRAM(S): at 16:00

## 2021-06-20 RX ADMIN — LEVETIRACETAM 500 MILLIGRAM(S): 250 TABLET, FILM COATED ORAL at 18:27

## 2021-06-20 RX ADMIN — FAMOTIDINE 20 MILLIGRAM(S): 10 INJECTION INTRAVENOUS at 17:57

## 2021-06-20 RX ADMIN — Medication 650 MILLIGRAM(S): at 09:55

## 2021-06-20 RX ADMIN — Medication 100 MILLIGRAM(S): at 02:07

## 2021-06-20 RX ADMIN — ENOXAPARIN SODIUM 40 MILLIGRAM(S): 100 INJECTION SUBCUTANEOUS at 21:40

## 2021-06-20 RX ADMIN — FAMOTIDINE 20 MILLIGRAM(S): 10 INJECTION INTRAVENOUS at 06:02

## 2021-06-20 RX ADMIN — Medication 650 MILLIGRAM(S): at 17:00

## 2021-06-20 RX ADMIN — Medication 650 MILLIGRAM(S): at 10:55

## 2021-06-20 NOTE — PHYSICAL THERAPY INITIAL EVALUATION ADULT - IMPAIRMENTS CONTRIBUTING TO GAIT DEVIATIONS, PT EVAL
impaired balance/impaired coordination/decreased flexibility/impaired motor control/impaired postural control/decreased ROM/decreased strength

## 2021-06-20 NOTE — DISCHARGE NOTE PROVIDER - NSDCMRMEDTOKEN_GEN_ALL_CORE_FT
Ducodyl 5 mg oral delayed release tablet: 1 tab(s) orally once a day, As Needed - for constipation  levETIRAcetam 500 mg oral tablet: 1 tab(s) orally every 12 hours  oxyCODONE 5 mg oral tablet: 1 tab(s) orally every 6 hours, As needed, Moderate Pain (4 10 MDD:4 tabs  Tylenol 325 mg oral tablet: 2 tab(s) orally , As Needed, prn   Ducodyl 5 mg oral delayed release tablet: 1 tab(s) orally once a day, As Needed - for constipation  levETIRAcetam 500 mg oral tablet: 1 tab(s) orally every 12 hours  oxyCODONE 5 mg oral tablet: 1 tab(s) orally every 6 hours, As needed, Moderate Pain (4 10 MDD:4 tabs  polyethylene glycol 3350 oral powder for reconstitution: 17 gram(s) orally once a day  Tylenol 325 mg oral tablet: 2 tab(s) orally , As Needed, prn

## 2021-06-20 NOTE — DISCHARGE NOTE PROVIDER - PROVIDER TOKENS
PROVIDER:[TOKEN:[[id number]],FOLLOWUP:[1 week]] PROVIDER:[TOKEN:[[id number]],FOLLOWUP:[Routine]]

## 2021-06-20 NOTE — DISCHARGE NOTE PROVIDER - CARE PROVIDER_API CALL
Nely Conroy)  [facility] Neurosurgery  General  [address], [address]  [place] [address]  Phone: [phone]  Fax: [phone]  Follow Up Time: 1 week   Nely Conroy)  [facility] Neurosurgery  General  [address], [address]  [place] [address]  Phone: [phone]  Fax: [phone]  Follow Up Time: Routine

## 2021-06-20 NOTE — PHYSICAL THERAPY INITIAL EVALUATION ADULT - MANUAL MUSCLE TESTING RESULTS, REHAB EVAL
LUE 5/5, LLE 5/5, right ankle 0/5, right quad 2-/5, right hip flexor 1/5, right adductor 2+/5. RUE grossly 1/5 throughout major muscle groups
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
## 2021-06-20 NOTE — PHYSICAL THERAPY INITIAL EVALUATION ADULT - GAIT DEVIATIONS NOTED, PT EVAL
decreased kathleen/increased time in double stance/footdrop/decreased velocity of limb motion/decreased step length/decreased stride length/decreased weight-shifting ability

## 2021-06-20 NOTE — DISCHARGE NOTE PROVIDER - NSDCCPCAREPLAN_GEN_ALL_CORE_FT
PRINCIPAL DISCHARGE DIAGNOSIS  Diagnosis: History of cranioplasty  Assessment and Plan of Treatment: Pt s/p cranioplasty  Do not drive while taking narcotics or pain medications.   Do not lift more than 5-10 lbs.   You may shower or shampoo your incision 4 days after surgery. You can let soap and water run on it and pat it dry.   Keep incision clean and dry and do not use any ointments.   Report to the ED for persistent fever, vomiting, headaches not relieved by medications, drainage from the incision or any change in neurologic or mental status or seizures.   Call the office to make a follow up appointment

## 2021-06-20 NOTE — PHYSICAL THERAPY INITIAL EVALUATION ADULT - BALANCE DISTURBANCE, IDENTIFIED IMPAIRMENT CONTRIBUTE, REHAB EVAL
impaired coordination/impaired motor control/abnormal muscle tone/impaired postural control/decreased strength

## 2021-06-20 NOTE — PHYSICAL THERAPY INITIAL EVALUATION ADULT - IMPAIRMENTS CONTRIBUTING IMPAIRED BED MOBILITY, REHAB EVAL
impaired balance/impaired coordination/impaired motor control/abnormal muscle tone/impaired postural control/decreased ROM/decreased strength

## 2021-06-20 NOTE — PHYSICAL THERAPY INITIAL EVALUATION ADULT - RANGE OF MOTION EXAMINATION, REHAB EVAL
RLE AROM limited due to previous injury, PROM limited due to increased tone throughout knee/ankle/hip. RUE increased elbow flexion tone/Left UE ROM was WFL (within functional limits)/Left LE ROM was WFL (within functional limits)

## 2021-06-20 NOTE — PHYSICAL THERAPY INITIAL EVALUATION ADULT - PERTINENT HX OF CURRENT PROBLEM, REHAB EVAL
patient is a 30year old male who is POD # 1 s/p cranioplasty. PMH significant for TBI with craniectomy on 3/2/20, residual right side hemiparesis

## 2021-06-20 NOTE — DISCHARGE NOTE PROVIDER - CARE PROVIDERS DIRECT ADDRESSES
[email or web address]

## 2021-06-20 NOTE — DISCHARGE NOTE PROVIDER - NSDCFUADDINST_GEN_ALL_CORE_FT
You have been started on a new medication, keppra.  Keppra (Levetiracetam) helps control and prevent seizures.  The most common side effects include diarrhea or constipation, excessive sleepiness, irritability and mood changes.  Rare and sometimes serious side effects include rash. Please inform your doctor if you expierence any side effects.   You have been started on a new medication, keppra.  Keppra (Levetiracetam) helps control and prevent seizures.  The most common side effects include diarrhea or constipation, excessive sleepiness, irritability and mood changes.  Rare and sometimes serious side effects include rash. Please inform your doctor if you experience any side effects.      Please shower and use shampoo on your hair. Pat incision area (do not scrub). Please shower regularly and keep incision area clean      Follow up with Dr. Conroy after rehab

## 2021-06-21 ENCOUNTER — TRANSCRIPTION ENCOUNTER (OUTPATIENT)
Age: 31
End: 2021-06-21

## 2021-06-21 RX ORDER — SENNA PLUS 8.6 MG/1
2 TABLET ORAL AT BEDTIME
Refills: 0 | Status: DISCONTINUED | OUTPATIENT
Start: 2021-06-21 | End: 2021-06-27

## 2021-06-21 RX ORDER — POLYETHYLENE GLYCOL 3350 17 G/17G
17 POWDER, FOR SOLUTION ORAL DAILY
Refills: 0 | Status: DISCONTINUED | OUTPATIENT
Start: 2021-06-21 | End: 2021-06-27

## 2021-06-21 RX ADMIN — POLYETHYLENE GLYCOL 3350 17 GRAM(S): 17 POWDER, FOR SOLUTION ORAL at 20:58

## 2021-06-21 RX ADMIN — SENNA PLUS 2 TABLET(S): 8.6 TABLET ORAL at 21:30

## 2021-06-21 RX ADMIN — FAMOTIDINE 20 MILLIGRAM(S): 10 INJECTION INTRAVENOUS at 05:33

## 2021-06-21 RX ADMIN — Medication 650 MILLIGRAM(S): at 04:38

## 2021-06-21 RX ADMIN — OXYCODONE HYDROCHLORIDE 5 MILLIGRAM(S): 5 TABLET ORAL at 08:45

## 2021-06-21 RX ADMIN — FAMOTIDINE 20 MILLIGRAM(S): 10 INJECTION INTRAVENOUS at 17:06

## 2021-06-21 RX ADMIN — LEVETIRACETAM 500 MILLIGRAM(S): 250 TABLET, FILM COATED ORAL at 17:06

## 2021-06-21 RX ADMIN — Medication 650 MILLIGRAM(S): at 22:00

## 2021-06-21 RX ADMIN — OXYCODONE HYDROCHLORIDE 5 MILLIGRAM(S): 5 TABLET ORAL at 09:45

## 2021-06-21 RX ADMIN — ENOXAPARIN SODIUM 40 MILLIGRAM(S): 100 INJECTION SUBCUTANEOUS at 21:30

## 2021-06-21 RX ADMIN — LEVETIRACETAM 500 MILLIGRAM(S): 250 TABLET, FILM COATED ORAL at 05:32

## 2021-06-21 RX ADMIN — Medication 650 MILLIGRAM(S): at 21:30
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
## 2021-06-21 NOTE — OCCUPATIONAL THERAPY INITIAL EVALUATION ADULT - IMPAIRED TRANSFERS: BED/CHAIR, REHAB EVAL
impaired balance/impaired motor control/abnormal muscle tone/impaired postural control/decreased strength

## 2021-06-21 NOTE — DISCHARGE NOTE NURSING/CASE MANAGEMENT/SOCIAL WORK - NSDCPNINST_GEN_ALL_CORE
Call MD for follow up appointment. Notify MD for any signs/symptoms of infection, fever greater than 101, redness, swelling, and or drainage. Drink plenty of fluids.

## 2021-06-21 NOTE — DISCHARGE NOTE NURSING/CASE MANAGEMENT/SOCIAL WORK - PATIENT PORTAL LINK FT
You can access the FollowMyHealth Patient Portal offered by [facility] by registering at the following website: [email or web address]. By joining [facility]’s FollowMyHealth portal, you will also be able to view your health information using other applications (apps) compatible with our system.

## 2021-06-21 NOTE — OCCUPATIONAL THERAPY INITIAL EVALUATION ADULT - PLANNED THERAPY INTERVENTIONS, OT EVAL
ADL retraining/balance training/bed mobility training/fine motor coordination training/motor coordination training/neuromuscular re-education/ROM/strengthening/stretching/transfer training

## 2021-06-22 DIAGNOSIS — Z98.890 OTHER SPECIFIED POSTPROCEDURAL STATES: ICD-10-CM

## 2021-06-22 PROCEDURE — 99222 1ST HOSP IP/OBS MODERATE 55: CPT

## 2021-06-22 RX ADMIN — SENNA PLUS 2 TABLET(S): 8.6 TABLET ORAL at 22:18

## 2021-06-22 RX ADMIN — LEVETIRACETAM 500 MILLIGRAM(S): 250 TABLET, FILM COATED ORAL at 06:20

## 2021-06-22 RX ADMIN — LEVETIRACETAM 500 MILLIGRAM(S): 250 TABLET, FILM COATED ORAL at 19:29

## 2021-06-22 RX ADMIN — Medication 650 MILLIGRAM(S): at 10:37

## 2021-06-22 RX ADMIN — POLYETHYLENE GLYCOL 3350 17 GRAM(S): 17 POWDER, FOR SOLUTION ORAL at 12:08

## 2021-06-22 RX ADMIN — FAMOTIDINE 20 MILLIGRAM(S): 10 INJECTION INTRAVENOUS at 06:20

## 2021-06-22 RX ADMIN — Medication 650 MILLIGRAM(S): at 11:35

## 2021-06-22 RX ADMIN — ENOXAPARIN SODIUM 40 MILLIGRAM(S): 100 INJECTION SUBCUTANEOUS at 22:18

## 2021-06-22 RX ADMIN — FAMOTIDINE 20 MILLIGRAM(S): 10 INJECTION INTRAVENOUS at 19:29
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
## 2021-06-23 RX ADMIN — LEVETIRACETAM 500 MILLIGRAM(S): 250 TABLET, FILM COATED ORAL at 17:20

## 2021-06-23 RX ADMIN — LEVETIRACETAM 500 MILLIGRAM(S): 250 TABLET, FILM COATED ORAL at 06:35

## 2021-06-23 RX ADMIN — POLYETHYLENE GLYCOL 3350 17 GRAM(S): 17 POWDER, FOR SOLUTION ORAL at 12:03

## 2021-06-23 RX ADMIN — SENNA PLUS 2 TABLET(S): 8.6 TABLET ORAL at 21:23

## 2021-06-23 RX ADMIN — ENOXAPARIN SODIUM 40 MILLIGRAM(S): 100 INJECTION SUBCUTANEOUS at 21:23

## 2021-06-23 RX ADMIN — Medication 650 MILLIGRAM(S): at 23:33

## 2021-06-23 RX ADMIN — FAMOTIDINE 20 MILLIGRAM(S): 10 INJECTION INTRAVENOUS at 17:20

## 2021-06-23 RX ADMIN — FAMOTIDINE 20 MILLIGRAM(S): 10 INJECTION INTRAVENOUS at 06:36

## 2021-06-23 RX ADMIN — Medication 5 MILLIGRAM(S): at 12:03

## 2021-06-24 RX ADMIN — LEVETIRACETAM 500 MILLIGRAM(S): 250 TABLET, FILM COATED ORAL at 06:30

## 2021-06-24 RX ADMIN — FAMOTIDINE 20 MILLIGRAM(S): 10 INJECTION INTRAVENOUS at 18:28

## 2021-06-24 RX ADMIN — Medication 650 MILLIGRAM(S): at 00:15

## 2021-06-24 RX ADMIN — FAMOTIDINE 20 MILLIGRAM(S): 10 INJECTION INTRAVENOUS at 06:30

## 2021-06-24 RX ADMIN — LEVETIRACETAM 500 MILLIGRAM(S): 250 TABLET, FILM COATED ORAL at 18:28

## 2021-06-25 LAB — SARS-COV-2 RNA SPEC QL NAA+PROBE: SIGNIFICANT CHANGE UP

## 2021-06-25 PROCEDURE — 99232 SBSQ HOSP IP/OBS MODERATE 35: CPT

## 2021-06-25 RX ADMIN — LEVETIRACETAM 500 MILLIGRAM(S): 250 TABLET, FILM COATED ORAL at 18:29

## 2021-06-25 RX ADMIN — LEVETIRACETAM 500 MILLIGRAM(S): 250 TABLET, FILM COATED ORAL at 06:35

## 2021-06-25 RX ADMIN — FAMOTIDINE 20 MILLIGRAM(S): 10 INJECTION INTRAVENOUS at 06:35

## 2021-06-25 RX ADMIN — SENNA PLUS 2 TABLET(S): 8.6 TABLET ORAL at 21:21

## 2021-06-25 RX ADMIN — FAMOTIDINE 20 MILLIGRAM(S): 10 INJECTION INTRAVENOUS at 18:29

## 2021-06-26 LAB
ANION GAP SERPL CALC-SCNC: 11 MMOL/L — SIGNIFICANT CHANGE UP (ref 7–14)
BUN SERPL-MCNC: 12 MG/DL — SIGNIFICANT CHANGE UP (ref 7–23)
CALCIUM SERPL-MCNC: 9.5 MG/DL — SIGNIFICANT CHANGE UP (ref 8.4–10.5)
CHLORIDE SERPL-SCNC: 104 MMOL/L — SIGNIFICANT CHANGE UP (ref 98–107)
CO2 SERPL-SCNC: 24 MMOL/L — SIGNIFICANT CHANGE UP (ref 22–31)
CREAT SERPL-MCNC: 0.81 MG/DL — SIGNIFICANT CHANGE UP (ref 0.5–1.3)
GLUCOSE SERPL-MCNC: 80 MG/DL — SIGNIFICANT CHANGE UP (ref 70–99)
HCT VFR BLD CALC: 37.8 % — LOW (ref 39–50)
HGB BLD-MCNC: 13.4 G/DL — SIGNIFICANT CHANGE UP (ref 13–17)
MCHC RBC-ENTMCNC: 33.4 PG — SIGNIFICANT CHANGE UP (ref 27–34)
MCHC RBC-ENTMCNC: 35.4 GM/DL — SIGNIFICANT CHANGE UP (ref 32–36)
MCV RBC AUTO: 94.3 FL — SIGNIFICANT CHANGE UP (ref 80–100)
NRBC # BLD: 0 /100 WBCS — SIGNIFICANT CHANGE UP
NRBC # FLD: 0 K/UL — SIGNIFICANT CHANGE UP
PLATELET # BLD AUTO: 252 K/UL — SIGNIFICANT CHANGE UP (ref 150–400)
POTASSIUM SERPL-MCNC: 4.3 MMOL/L — SIGNIFICANT CHANGE UP (ref 3.5–5.3)
POTASSIUM SERPL-SCNC: 4.3 MMOL/L — SIGNIFICANT CHANGE UP (ref 3.5–5.3)
RBC # BLD: 4.01 M/UL — LOW (ref 4.2–5.8)
RBC # FLD: 11.8 % — SIGNIFICANT CHANGE UP (ref 10.3–14.5)
SODIUM SERPL-SCNC: 139 MMOL/L — SIGNIFICANT CHANGE UP (ref 135–145)
WBC # BLD: 6.9 K/UL — SIGNIFICANT CHANGE UP (ref 3.8–10.5)
WBC # FLD AUTO: 6.9 K/UL — SIGNIFICANT CHANGE UP (ref 3.8–10.5)

## 2021-06-26 RX ADMIN — FAMOTIDINE 20 MILLIGRAM(S): 10 INJECTION INTRAVENOUS at 05:32

## 2021-06-26 RX ADMIN — FAMOTIDINE 20 MILLIGRAM(S): 10 INJECTION INTRAVENOUS at 17:30

## 2021-06-26 RX ADMIN — SENNA PLUS 2 TABLET(S): 8.6 TABLET ORAL at 21:14

## 2021-06-26 RX ADMIN — LEVETIRACETAM 500 MILLIGRAM(S): 250 TABLET, FILM COATED ORAL at 05:31

## 2021-06-26 RX ADMIN — LEVETIRACETAM 500 MILLIGRAM(S): 250 TABLET, FILM COATED ORAL at 17:30

## 2021-06-27 ENCOUNTER — INPATIENT (INPATIENT)
Facility: HOSPITAL | Age: 31
LOS: 36 days | Discharge: HOME HEALTH | DRG: 949 | End: 2021-08-03
Attending: STUDENT IN AN ORGANIZED HEALTH CARE EDUCATION/TRAINING PROGRAM | Admitting: STUDENT IN AN ORGANIZED HEALTH CARE EDUCATION/TRAINING PROGRAM
Payer: COMMERCIAL

## 2021-06-27 VITALS
DIASTOLIC BLOOD PRESSURE: 63 MMHG | TEMPERATURE: 98 F | RESPIRATION RATE: 17 BRPM | SYSTOLIC BLOOD PRESSURE: 129 MMHG | OXYGEN SATURATION: 100 % | HEART RATE: 80 BPM

## 2021-06-27 VITALS
OXYGEN SATURATION: 98 % | HEART RATE: 74 BPM | HEIGHT: 68 IN | TEMPERATURE: 98 F | WEIGHT: 199.3 LBS | DIASTOLIC BLOOD PRESSURE: 77 MMHG | RESPIRATION RATE: 14 BRPM | SYSTOLIC BLOOD PRESSURE: 113 MMHG

## 2021-06-27 DIAGNOSIS — Z93.1 GASTROSTOMY STATUS: Chronic | ICD-10-CM

## 2021-06-27 DIAGNOSIS — D32.0 BENIGN NEOPLASM OF CEREBRAL MENINGES: ICD-10-CM

## 2021-06-27 DIAGNOSIS — Z98.890 OTHER SPECIFIED POSTPROCEDURAL STATES: Chronic | ICD-10-CM

## 2021-06-27 DIAGNOSIS — S06.9X9A UNSPECIFIED INTRACRANIAL INJURY WITH LOSS OF CONSCIOUSNESS OF UNSPECIFIED DURATION, INITIAL ENCOUNTER: Chronic | ICD-10-CM

## 2021-06-27 RX ORDER — ACETAMINOPHEN 500 MG
650 TABLET ORAL EVERY 6 HOURS
Refills: 0 | Status: DISCONTINUED | OUTPATIENT
Start: 2021-06-27 | End: 2021-08-03

## 2021-06-27 RX ORDER — LEVETIRACETAM 250 MG/1
500 TABLET, FILM COATED ORAL EVERY 12 HOURS
Refills: 0 | Status: DISCONTINUED | OUTPATIENT
Start: 2021-06-27 | End: 2021-06-28

## 2021-06-27 RX ORDER — SENNA PLUS 8.6 MG/1
2 TABLET ORAL AT BEDTIME
Refills: 0 | Status: DISCONTINUED | OUTPATIENT
Start: 2021-06-27 | End: 2021-07-06

## 2021-06-27 RX ORDER — POLYETHYLENE GLYCOL 3350 17 G/17G
17 POWDER, FOR SOLUTION ORAL
Qty: 0 | Refills: 0 | DISCHARGE
Start: 2021-06-27

## 2021-06-27 RX ORDER — ENOXAPARIN SODIUM 100 MG/ML
40 INJECTION SUBCUTANEOUS AT BEDTIME
Refills: 0 | Status: DISCONTINUED | OUTPATIENT
Start: 2021-06-27 | End: 2021-08-03

## 2021-06-27 RX ORDER — FAMOTIDINE 10 MG/ML
20 INJECTION INTRAVENOUS EVERY 12 HOURS
Refills: 0 | Status: DISCONTINUED | OUTPATIENT
Start: 2021-06-27 | End: 2021-07-12

## 2021-06-27 RX ORDER — POLYETHYLENE GLYCOL 3350 17 G/17G
17 POWDER, FOR SOLUTION ORAL DAILY
Refills: 0 | Status: DISCONTINUED | OUTPATIENT
Start: 2021-06-27 | End: 2021-07-06

## 2021-06-27 RX ADMIN — Medication 650 MILLIGRAM(S): at 05:47

## 2021-06-27 RX ADMIN — LEVETIRACETAM 500 MILLIGRAM(S): 250 TABLET, FILM COATED ORAL at 05:45

## 2021-06-27 RX ADMIN — SENNA PLUS 2 TABLET(S): 8.6 TABLET ORAL at 21:15

## 2021-06-27 RX ADMIN — LEVETIRACETAM 500 MILLIGRAM(S): 250 TABLET, FILM COATED ORAL at 17:56

## 2021-06-27 RX ADMIN — FAMOTIDINE 20 MILLIGRAM(S): 10 INJECTION INTRAVENOUS at 17:57

## 2021-06-27 RX ADMIN — FAMOTIDINE 20 MILLIGRAM(S): 10 INJECTION INTRAVENOUS at 05:45

## 2021-06-27 RX ADMIN — Medication 650 MILLIGRAM(S): at 06:41
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
## 2021-06-27 NOTE — H&P ADULT - NSHPREVIEWOFSYSTEMS_GEN_ALL_CORE
REVIEW OF SYSTEMS:    CONSTITUTIONAL: No fevers or chills  EYES/ENT: No visual changes;  No vertigo or throat pain   NECK: No pain or stiffness  RESPIRATORY: No cough, wheezing, or shortness of breath  CARDIOVASCULAR: No chest pain or palpitations  GASTROINTESTINAL: No abdominal or epigastric pain. No nausea or vomiting. No diarrhea or constipation.   GENITOURINARY: No dysuria, frequency or hematuria  NEUROLOGICAL: + numbness, +weakness  SKIN: No itching, rashes REVIEW OF SYSTEMS:    CONSTITUTIONAL: No fevers or chills  EYES/ENT: No visual changes;  No vertigo or throat pain   NECK: No pain or stiffness  RESPIRATORY: No cough, wheezing, or shortness of breath  CARDIOVASCULAR: No chest pain or palpitations  GASTROINTESTINAL: No abdominal or epigastric pain. No nausea or vomiting. No diarrhea or constipation.   GENITOURINARY: No dysuria, frequency or hematuria  NEUROLOGICAL: + numbness, +weakness. right HP  SKIN: No itching, rashes

## 2021-06-27 NOTE — PROGRESS NOTE ADULT - PROVIDER SPECIALTY LIST ADULT
Neurosurgery
Rehab Medicine
Neurosurgery
SICU
Anesthesia
Neurosurgery

## 2021-06-27 NOTE — H&P ADULT - ATTENDING COMMENTS
Pt. seen 6/28/21 AM.  Agree with documentation above as per resident on call with amendments made as appropriate. Patient medically stable. Appropriate for acute rehabilitation.    Pt. known to me from outpatient follow up.  reports he slept during the night.  slight pain at incision controlled.  No seizure events after surgery.  Motivated.  Continent-- PVR=78    Vital Signs Last 24 Hrs  T(C): 36.7 (28 Jun 2021 07:46), Max: 36.7 (28 Jun 2021 07:46)  T(F): 98 (28 Jun 2021 07:46), Max: 98 (28 Jun 2021 07:46)  HR: 59 (28 Jun 2021 07:46) (59 - 74)  BP: 109/65 (28 Jun 2021 07:46) (109/65 - 131/84)  BP(mean): --  RR: 15 (28 Jun 2021 07:46) (14 - 15)  SpO2: 98% (28 Jun 2021 07:46) (98% - 99%)    Physical exam as amended above                          14.2   5.55  )-----------( 286      ( 28 Jun 2021 05:00 )             39.9   06-28    140  |  104  |  12  ----------------------------<  86  4.0   |  30  |  0.90    Ca    9.0      28 Jun 2021 05:00    TPro  7.8  /  Alb  3.8  /  TBili  0.4  /  DBili  x   /  AST  25  /  ALT  43  /  AlkPhos  88  06-28    30M with PMH of TBI s/p left frontoparietal craniectomy (3/2020) presented to [facility] on 6/18/21 for scheduled left cranioplasty, with right Hemiparesis, Spasticity, dysmetria, gait and aDL impairment.     d/c keppra as pt. completed 7 day seizure ppx course.      d/c PVR monitoring as vol. low.
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
## 2021-06-27 NOTE — PROGRESS NOTE ADULT - PROBLEM SELECTOR PLAN 1
- eval by PMR for dispo planning    Case discussed with attending neurosurgeon.
- f/u w/ SW   - dispo to Acute rehab    Case discussed with attending neurosurgeon.
- f/u w/ SW   - dispo to Acute rehab  - dvt ppx chemical  - c/w keppra      Case discussed with attending neurosurgeon.
Continue PT/OT  [facility]  Covid swab today
Neurologic checks Q1H  Monitor drain output  Head CT in AM  PT consult
Neurologic checks Q1H  Monitor drain output  Head CT in AM  PT consult
Transfer to [place] rehab today
Discharge home today  Home PT
Continue PT/OT  Await disposition

## 2021-06-27 NOTE — PROGRESS NOTE ADULT - PROBLEM SELECTOR PROBLEM 1
S/P craniotomy
S/P craniotomy
History of cranioplasty
S/P craniotomy
History of cranioplasty
History of cranioplasty
S/P craniotomy

## 2021-06-27 NOTE — H&P ADULT - NSHPPHYSICALEXAM_GEN_ALL_CORE
Constitutional - NAD, Comfortably lying in bed  HEENT - +left cranial incision, mainly sutures, +1 staple, clean and dry without drainage, EOMI  Neck - Supple, No limited ROM  Chest - Breathing comfortably, No wheezing, CTAB  Cardiovascular - S1S2, RRR  Abdomen - Soft, nontender, nondistended  Extremities - No C/C/E, No calf tenderness   Neurologic Exam -                    Cognitive - Awake, Alert, AAO to self, place, date, year, situation     Communication - Fluent, No dysarthria     Cranial Nerves - CN 2-12 intact     Motor -                     LEFT    UE - ShAB 5/5, EF 5/5, EE 5/5, WE 5/5,  5/5                    RIGHT UE - ShAB 3/5, EF 2/5, EE 2/5, WE 1/5,  1/5                    LEFT    LE - HF 5/5, KE 5/5, DF 5/5, PF 5/5                    RIGHT LE - HF 2/5, KE 2/5, DF 1/5, PF 1/5        Sensory - decreased sensation at right fingertips     Coordination - FTN intact on left     Tone: MAS 2 RUE, RLE  Psychiatric - Mood stable, Affect WNL Constitutional - NAD, Comfortably lying in bed  HEENT - +left cranial incision, mainly sutures, +1 staple, clean and dry without drainage, EOMI  Neck - Supple, No limited ROM  Chest - Breathing comfortably, No wheezing, CTAB  Cardiovascular - S1S2, RRR  Abdomen - Soft, nontender, nondistended  Extremities - No C/C/E, No calf tenderness   Neurologic Exam -                    Cognitive - Awake, Alert, AAO to self, place, date, year, situation     Communication - Fluent, No dysarthria     Attention and memory intact     Cranial Nerves - CN 2-12 intact     Motor -                     LEFT    UE - ShAB 5/5, EF 5/5, EE 5/5, WE 5/5,  5/5                    RIGHT UE - ShAB 3/5, EF 3/5, EE 2/5, WE 1/5,  1/5                    LEFT    LE - HF 5/5, KE 5/5, DF 5/5, PF 5/5                    RIGHT LE - HF 2/5, KE 2/5, DF 1/5, PF 1/5        Sensory - decreased sensation at right fingertips     Coordination - FTN and HTS impaired on right     Tone: MAS 2 RUE, RLE  Psychiatric - Mood stable, Affect WNL

## 2021-06-27 NOTE — PROGRESS NOTE ADULT - NSICDXPILOT_GEN_ALL_CORE
[place]

## 2021-06-27 NOTE — PROGRESS NOTE ADULT - SUBJECTIVE AND OBJECTIVE BOX
PAST 24HR EVENTS: no issues overnight     PHYSICAL EXAM:  Vital Signs Last 24 Hrs  T(C): 36.9 (21 Jun 2021 21:15), Max: 36.9 (21 Jun 2021 21:15)  T(F): 98.5 (21 Jun 2021 21:15), Max: 98.5 (21 Jun 2021 21:15)  HR: 68 (21 Jun 2021 21:15) (58 - 76)  BP: 125/70 (21 Jun 2021 21:15) (116/56 - 136/65)  BP(mean): --  RR: 17 (21 Jun 2021 21:15) (16 - 17)  SpO2: 100% (21 Jun 2021 21:15) (98% - 100%)    AAO X 3  PERRLA, EOMI  Mild right facial droop, CN 2-12 otherwise grossly intact  CRUZ, Left UE/LE 5/5, Right UE 1/5, RLE 2/5 (baseline)  SILT    MEDS:   acetaminophen   Tablet .. 650 milliGRAM(s) Oral every 6 hours PRN  enoxaparin Injectable 40 milliGRAM(s) SubCutaneous at bedtime  famotidine    Tablet 20 milliGRAM(s) Oral every 12 hours  levETIRAcetam 500 milliGRAM(s) Oral every 12 hours  ondansetron Injectable 4 milliGRAM(s) IV Push every 6 hours PRN  oxyCODONE    IR 5 milliGRAM(s) Oral every 6 hours PRN  polyethylene glycol 3350 17 Gram(s) Oral daily  senna 2 Tablet(s) Oral at bedtime          
Patient is a 30y old  Male who presents with a chief complaint of TBI (19 Jun 2021 08:53)     Interval history: patient reports bm yesterday. Ambulated with PT yesterday, on schedule for therapy today.      REVIEW OF SYSTEMS  Constitutional - No fever, No weight loss, No fatigue  HEENT - No eye pain, No visual disturbances, No difficulty hearing, No tinnitus, No vertigo, No neck pain  Respiratory - No cough, No wheezing, No shortness of breath  Cardiovascular - No chest pain, No palpitations  Gastrointestinal - No abdominal pain, No nausea, No vomiting, No diarrhea, No constipation  Genitourinary - No dysuria, No frequency, No hematuria, No incontinence  Neurological - No headaches, No memory loss, + loss of strength, No numbness, No tremors  Skin - No itching, No rashes, No lesions   Endocrine - No temperature intolerance  Musculoskeletal - No joint pain, No joint swelling, No muscle pain  Psychiatric - No depression, No anxiety    PAST MEDICAL & SURGICAL HISTORY  Hypertension    Obesity    Hemiparesis, right    Traumatic brain injury    Traumatic brain injury    S/P percutaneous endoscopic gastrostomy (PEG) tube placement    H/O tracheostomy        SOCIAL HISTORY  Smoking - Denied  EtOH - used to drink alcohol occasionally  Drugs - Denied    FUNCTIONAL HISTORY  lives with mom and brother in house with 4 stairs  (needed assistance for ambulation and stairs at home)  went to rehab after his injury and was able to ambulate short distances with KAFO and hemiwalker. He also has a manual wheelchair  also has a commode and grab bars    CURRENT FUNCTIONAL STATUS  6/24: seen for physical therapy treatment seated on commode, no distress, AxOx4, with +IV and head sutures intact. Pt tolerated treatment well where he performed bilateral UE and LE active/active assisted ROM Exercises to improve strength. Pt required minimal assistx1 with sit to stand and ambulation of 15ft using hemiwalker+ chair follow wearing Right KAFO. Pt requires assistance advancing right LE.  Pt left comfortable in chair, NAD, all lines intact, all precautions maintained, with call bell in   Comment: sofia, and WILL padgett.      FAMILY HISTORY   No pertinent family history in first degree relatives        VITALS  T(C): 37 (06-25-21 @ 10:15), Max: 37.1 (06-24-21 @ 21:20)  HR: 73 (06-25-21 @ 10:15) (59 - 73)  BP: 110/68 (06-25-21 @ 10:15) (105/54 - 121/70)  RR: 17 (06-25-21 @ 10:15) (16 - 17)  SpO2: 100% (06-25-21 @ 10:15) (99% - 100%)  Wt(kg): --    ALLERGIES  No Known Allergies      MEDICATIONS   acetaminophen   Tablet .. 650 milliGRAM(s) Oral every 6 hours PRN  bisacodyl 5 milliGRAM(s) Oral every 12 hours  enoxaparin Injectable 40 milliGRAM(s) SubCutaneous at bedtime  famotidine    Tablet 20 milliGRAM(s) Oral every 12 hours  levETIRAcetam 500 milliGRAM(s) Oral every 12 hours  ondansetron Injectable 4 milliGRAM(s) IV Push every 6 hours PRN  oxyCODONE    IR 5 milliGRAM(s) Oral every 6 hours PRN  polyethylene glycol 3350 17 Gram(s) Oral daily  senna 2 Tablet(s) Oral at bedtime      ----------------------------------------------------------------------------------------  PHYSICAL EXAM -  Constitutional - NAD, Comfortable  HEENT - + sutures, + 1 staple  Neck - Supple, No limited ROM  Chest - no respiratory distress  Cardiovascular - RRR, S1S2   Abdomen -  Soft, NTND  Extremities - No C/C/E, No calf tenderness   Neurologic Exam -                    Cognitive - Awake, Alert, AAO to self, place, date, year, situation     Communication - Fluent, + dysarthria     Cranial Nerves - R facial weakness     Motor -  + increased tone RUE,  + RLE clonus                    LEFT    UE - ShAB 5/5, EF 5/5, EE 5/5, WE 5/5,  5/5                    RIGHT UE - ShAB 2/5, EF 3/5, EE 2-/5, WE 2/5,  2/5 (using synergies)                    LEFT    LE - HF 5/5, KE 5/5, DF 5/5, PF 5/5                    RIGHT LE -  HF 2/5, KE 2/5, DF 2/5, PF 2/5       Sensory - Intact to LT       OculoVestibular - No saccades, No nystagmus, VOR         Balance - WNL Static  Psychiatric - Mood stable, Affect WNL  ----------------------------------------------------------------------------------------  ASSESSMENT/PLAN  30 year old male pmh tbi 2020 presented for L cranioplasty    Pain -tylenol, oxy ir prn, with senna, miralax- patient reports last bm 4 days ago, agreeable to senna tonight.  DVT PPX - lovenox  Diet: regular  continue bedside PT and OT  patient reports heart palpitations, requests medical evaluation  Rehab -  recommend acute inpatient rehab when medically cleared. Patient can tolerate 3 hours/day of therapy with medical supervision.      please repeat labs prior to discharge. 
ANESTHESIA POSTOP CHECK    30y Male POSTOP DAY 1 S/P Craniectomy    Vital Signs Last 24 Hrs  T(C): 36.8 (19 Jun 2021 08:00), Max: 37 (18 Jun 2021 12:10)  T(F): 98.2 (19 Jun 2021 08:00), Max: 98.6 (18 Jun 2021 12:10)  HR: 90 (19 Jun 2021 08:00) (69 - 106)  BP: 121/74 (19 Jun 2021 08:00) (105/64 - 144/86)  BP(mean): 85 (19 Jun 2021 08:00) (71 - 102)  RR: 14 (19 Jun 2021 08:00) (10 - 21)  SpO2: 98% (19 Jun 2021 08:00) (93% - 100%)  I&O's Summary    18 Jun 2021 07:01  -  19 Jun 2021 07:00  --------------------------------------------------------  IN: 1775 mL / OUT: 1480 mL / NET: 295 mL    19 Jun 2021 07:01  -  19 Jun 2021 08:53  --------------------------------------------------------  IN: 75 mL / OUT: 200 mL / NET: -125 mL        [X ] NO APPARENT ANESTHESIA COMPLICATIONS      Comments: 
SICU AM PROGRESS NOTE  ===============================  Interval Events:   -No acute events overnight    HPI  31y/o male with a history of traumatic brain injury and is s/p left frontoparietal craniectomy. Pt currently wheelchair bound with right hemiparesis.  He presents for scheduled left cranioplasty. In the OR, had 1 subgaleal drain placed. SICU consulted for q 1 hour neurochecks    VITAL SIGNS, INS/OUTS (last 24 hours):  --------------------------------------------------------------------------------------  T(C): 36.8 (06-18-21 @ 23:00), Max: 37.1 (06-18-21 @ 07:00)  HR: 81 (06-19-21 @ 01:00) (69 - 106)  BP: 127/73 (06-19-21 @ 01:00) (105/64 - 144/86)  BP(mean): 85 (06-19-21 @ 01:00) (71 - 98)  RR: 24 (06-19-21 @ 01:00) (10 - 24)  SpO2: 98% (06-19-21 @ 01:00) (93% - 100%)  CAPILLARY BLOOD GLUCOSE    06-18 @ 07:01  -  06-19 @ 02:38  --------------------------------------------------------  IN:    IV PiggyBack: 50 mL    Oral Fluid: 200 mL    sodium chloride 0.9%: 1125 mL  Total IN: 1375 mL    OUT:    Bulb (mL): 35 mL    Voided (mL): 1000 mL  Total OUT: 1035 mL    Total NET: 340 mL  --------------------------------------------------------------------------------------    EXAM  NEUROLOGY  Exam: Normal, NAD, alert, oriented x 3, right hemiparesis, weakness of right and lower ext, slurred speech    RESPIRATORY  Exam: Lungs clear to auscultation, Normal expansion/effort.       CARDIOVASCULAR  Exam: S1, S2.  Regular rate and rhythm    GI/NUTRITION  Exam: Abdomen soft, Non-tender, Non-distended.    Current Diet:  Advance to regular as tolerated    METABOLIC/FLUIDS/ELECTROLYTES  sodium chloride 0.9%. 1000 milliLiter(s) IV Continuous <Continuous>      HEMATOLOGIC  [x] DVT Prophylaxis:   Transfusions:	[] PRBC	[] Platelets		[] FFP	[] Cryoprecipitate    INFECTIOUS DISEASE  Antimicrobials/Immunologic Medications:  ceFAZolin   IVPB 2000 milliGRAM(s) IV Intermittent every 8 hours      VASCULAR  Exam: Extremities warm, pink, well-perfused.    MUSCULOSKELETAL  Exam: All extremities moving spontaneously without limitations.      SKIN  Exam: Good skin turgor, no skin breakdown.     LABS  --------------------------------------------------------------------------------------  CBC (06-18 @ 17:05)                              13.7                           11.84<H>  )----------------(  215        --    % Neutrophils, --    % Lymphocytes, ANC: --                                  38.1<L>    BMP (06-18 @ 17:05)             139     |  105     |  12    		Ca++ --      Ca 9.1                ---------------------------------( 106<H>		Mg 1.7                4.5     |  23      |  0.93  			Ph 4.1     --------------------------------------------------------------------------------------    IMAGING STUDIES    
No issues overnight  Subgaleal drain removed 6/20  Vital Signs Last 24 Hrs  T(C): 37.2 (20 Jun 2021 21:43), Max: 37.2 (20 Jun 2021 21:43)  T(F): 99 (20 Jun 2021 21:43), Max: 99 (20 Jun 2021 21:43)  HR: 76 (20 Jun 2021 21:43) (70 - 87)  BP: 124/60 (20 Jun 2021 21:43) (113/67 - 137/78)  BP(mean): --  RR: 16 (20 Jun 2021 21:43) (16 - 18)  SpO2: 97% (20 Jun 2021 21:43) (97% - 99%)    AAO X 3  PERRLA, EOMI  Mild right facial droop, CN 2-12 otherwise grossly intact  CRUZ, Left UE/LE 5/5, Right UE 1/5, RLE 2/5  SILT    MEDICATIONS  (STANDING):  enoxaparin Injectable 40 milliGRAM(s) SubCutaneous at bedtime  famotidine    Tablet 20 milliGRAM(s) Oral every 12 hours  levETIRAcetam 500 milliGRAM(s) Oral every 12 hours    MEDICATIONS  (PRN):  acetaminophen   Tablet .. 650 milliGRAM(s) Oral every 6 hours PRN Mild Pain (1 - 3)  ondansetron Injectable 4 milliGRAM(s) IV Push every 6 hours PRN Nausea and/or Vomiting  oxyCODONE    IR 5 milliGRAM(s) Oral every 6 hours PRN Moderate Pain (4 10                          12.4   8.71  )-----------( 205      ( 19 Jun 2021 06:20 )             35.3     06-19    139  |  104  |  7   ----------------------------<  99  4.1   |  23  |  0.85    Ca    8.8      19 Jun 2021 06:20  Phos  3.8     06-19  Mg     1.7     06-19    
No issues overnight  Vital Signs Last 24 Hrs  T(C): 36.4 (26 Jun 2021 01:09), Max: 37.1 (25 Jun 2021 06:47)  T(F): 97.5 (26 Jun 2021 01:09), Max: 98.7 (25 Jun 2021 06:47)  HR: 55 (26 Jun 2021 01:09) (55 - 79)  BP: 118/65 (26 Jun 2021 01:09) (105/54 - 122/75)  BP(mean): 74 (25 Jun 2021 21:07) (74 - 84)  RR: 14 (26 Jun 2021 01:09) (14 - 17)  SpO2: 100% (26 Jun 2021 01:09) (99% - 100%)    AAO X 3  PERRLA, EOMI  Mild right facial droop, CN 2-12 otherwise grossly intact  CRUZ, Left UE/LE 5/5, Right UE 2/5, RLE 2/5 (baseline)  SILT    MEDICATIONS  (STANDING):  bisacodyl 5 milliGRAM(s) Oral every 12 hours  enoxaparin Injectable 40 milliGRAM(s) SubCutaneous at bedtime  famotidine    Tablet 20 milliGRAM(s) Oral every 12 hours  levETIRAcetam 500 milliGRAM(s) Oral every 12 hours  polyethylene glycol 3350 17 Gram(s) Oral daily  senna 2 Tablet(s) Oral at bedtime    MEDICATIONS  (PRN):  acetaminophen   Tablet .. 650 milliGRAM(s) Oral every 6 hours PRN Mild Pain (1 - 3)  ondansetron Injectable 4 milliGRAM(s) IV Push every 6 hours PRN Nausea and/or Vomiting  oxyCODONE    IR 5 milliGRAM(s) Oral every 6 hours PRN Moderate Pain (4 10    
No issues overnight  Vital Signs Last 24 Hrs  T(C): 36.8 (27 Jun 2021 01:05), Max: 36.9 (26 Jun 2021 17:19)  T(F): 98.3 (27 Jun 2021 01:05), Max: 98.5 (26 Jun 2021 17:19)  HR: 65 (27 Jun 2021 01:05) (54 - 78)  BP: 110/60 (27 Jun 2021 01:05) (110/60 - 144/81)  BP(mean): --  RR: 17 (27 Jun 2021 01:05) (15 - 17)  SpO2: 100% (27 Jun 2021 01:05) (100% - 100%)    AAO X 3  PERRLA, EOMI  Mild right facial droop, CN 2-12 otherwise grossly intact  CRUZ, Left UE/LE 5/5, Right UE 2/5, RLE 2/5   SILT    MEDICATIONS  (STANDING):  bisacodyl 5 milliGRAM(s) Oral every 12 hours  enoxaparin Injectable 40 milliGRAM(s) SubCutaneous at bedtime  famotidine    Tablet 20 milliGRAM(s) Oral every 12 hours  levETIRAcetam 500 milliGRAM(s) Oral every 12 hours  polyethylene glycol 3350 17 Gram(s) Oral daily  senna 2 Tablet(s) Oral at bedtime    MEDICATIONS  (PRN):  acetaminophen   Tablet .. 650 milliGRAM(s) Oral every 6 hours PRN Mild Pain (1 - 3)  ondansetron Injectable 4 milliGRAM(s) IV Push every 6 hours PRN Nausea and/or Vomiting                          13.4   6.90  )-----------( 252      ( 26 Jun 2021 07:10 )             37.8     06-26    139  |  104  |  12  ----------------------------<  80  4.3   |  24  |  0.81    Ca    9.5      26 Jun 2021 07:10    COVID-19 PCR: NotDetec (25 Jun 2021 17:37)  
Post op Note    Dx:    30y    Male   s/p cranioplasty. Stable exam as preop. TIFFANY drain not holding suction well.         T(C): 36.9 (06-18-21 @ 15:00), Max: 37.1 (06-18-21 @ 07:00)  HR: 73 (06-18-21 @ 15:00) (73 - 106)  BP: 105/64 (06-18-21 @ 15:00) (105/64 - 144/86)  RR: 13 (06-18-21 @ 15:00) (10 - 16)  SpO2: 95% (06-18-21 @ 15:00) (93% - 100%)      Physical exam  Awake, alert, oriented x 3, PERRL, EOMI  Follows commands, slightly slurred speech  RUE 1/5 RLE anti gravity RLE 2/5 LUE/LLE 5/5 strength   Incision: C/D/I    
No issues overnight  Vital Signs Last 24 Hrs  T(C): 37.1 (24 Jun 2021 21:20), Max: 37.1 (24 Jun 2021 21:20)  T(F): 98.7 (24 Jun 2021 21:20), Max: 98.7 (24 Jun 2021 21:20)  HR: 72 (24 Jun 2021 21:20) (55 - 76)  BP: 121/70 (24 Jun 2021 21:20) (102/49 - 136/92)  BP(mean): --  RR: 16 (24 Jun 2021 21:20) (16 - 16)  SpO2: 100% (24 Jun 2021 21:20) (99% - 100%)    AAO X 3  PERRLA, EOMI  Mild right facial droop, CN 2-12 otherwise grossly intact  CRUZ, Left UE/LE 5/5, Right UE 2/5, RLE 2/5 (baseline)  SILT    MEDICATIONS  (STANDING):  bisacodyl 5 milliGRAM(s) Oral every 12 hours  enoxaparin Injectable 40 milliGRAM(s) SubCutaneous at bedtime  famotidine    Tablet 20 milliGRAM(s) Oral every 12 hours  levETIRAcetam 500 milliGRAM(s) Oral every 12 hours  polyethylene glycol 3350 17 Gram(s) Oral daily  senna 2 Tablet(s) Oral at bedtime    MEDICATIONS  (PRN):  acetaminophen   Tablet .. 650 milliGRAM(s) Oral every 6 hours PRN Mild Pain (1 - 3)  ondansetron Injectable 4 milliGRAM(s) IV Push every 6 hours PRN Nausea and/or Vomiting  oxyCODONE    IR 5 milliGRAM(s) Oral every 6 hours PRN Moderate Pain (4 10    
PAST 24HR EVENTS: no issues overnight     Vital Signs Last 24 Hrs  T(C): 36.5 (23 Jun 2021 01:52), Max: 37.4 (22 Jun 2021 17:46)  T(F): 97.7 (23 Jun 2021 01:52), Max: 99.3 (22 Jun 2021 17:46)  HR: 54 (23 Jun 2021 01:52) (54 - 68)  BP: 110/53 (23 Jun 2021 01:52) (110/53 - 133/75)  BP(mean): 77 (22 Jun 2021 21:54) (77 - 77)  ABP: --  ABP(mean): --  RR: 16 (23 Jun 2021 01:52) (14 - 17)  SpO2: 99% (23 Jun 2021 01:52) (98% - 100%)      AAO X 3  PERRLA, EOMI  Mild right facial droop, CN 2-12 otherwise grossly intact  CRUZ, Left UE/LE 5/5, Right UE 2/5, RLE 2/5 (baseline)  SILT    MEDS:   acetaminophen   Tablet .. 650 milliGRAM(s) Oral every 6 hours PRN  enoxaparin Injectable 40 milliGRAM(s) SubCutaneous at bedtime  famotidine    Tablet 20 milliGRAM(s) Oral every 12 hours  levETIRAcetam 500 milliGRAM(s) Oral every 12 hours  ondansetron Injectable 4 milliGRAM(s) IV Push every 6 hours PRN  oxyCODONE    IR 5 milliGRAM(s) Oral every 6 hours PRN  polyethylene glycol 3350 17 Gram(s) Oral daily  senna 2 Tablet(s) Oral at bedtime          
PAST 24HR EVENTS: no issues overnight     Vital Signs Last 24 Hrs  T(C): 36.5 (24 Jun 2021 01:48), Max: 36.8 (23 Jun 2021 17:40)  T(F): 97.7 (24 Jun 2021 01:48), Max: 98.3 (23 Jun 2021 17:40)  HR: 55 (24 Jun 2021 01:48) (55 - 73)  BP: 105/50 (24 Jun 2021 01:48) (102/69 - 131/72)  BP(mean): --  ABP: --  ABP(mean): --  RR: 16 (24 Jun 2021 01:48) (16 - 17)  SpO2: 99% (24 Jun 2021 01:48) (97% - 100%)        AAO X 3  PERRLA, EOMI  Mild right facial droop, CN 2-12 otherwise grossly intact  CRUZ, Left UE/LE 5/5, Right UE 2/5, RLE 2/5 (baseline)  SILT    MEDS:   acetaminophen   Tablet .. 650 milliGRAM(s) Oral every 6 hours PRN  enoxaparin Injectable 40 milliGRAM(s) SubCutaneous at bedtime  famotidine    Tablet 20 milliGRAM(s) Oral every 12 hours  levETIRAcetam 500 milliGRAM(s) Oral every 12 hours  ondansetron Injectable 4 milliGRAM(s) IV Push every 6 hours PRN  oxyCODONE    IR 5 milliGRAM(s) Oral every 6 hours PRN  polyethylene glycol 3350 17 Gram(s) Oral daily  senna 2 Tablet(s) Oral at bedtime          
Patient seen and examined at bedside.    --Anticoagulation--  enoxaparin Injectable 40 milliGRAM(s) SubCutaneous at bedtime    T(C): 36.5 (06-20-21 @ 01:36), Max: 37.2 (06-19-21 @ 14:32)  HR: 85 (06-20-21 @ 01:36) (72 - 94)  BP: 113/67 (06-20-21 @ 01:36) (113/67 - 143/86)  RR: 17 (06-20-21 @ 01:36) (12 - 18)  SpO2: 99% (06-20-21 @ 01:36) (95% - 99%)  Wt(kg): --    Exam:  AAO X 3  PERRLA, EOMI  Mild right facial droop, CN 2-12 otherwise grossly intact  CRUZ, Left UE/LE 5/5, Right UE 1/5, RLE 2/5  SILT
No issues overnight  C/O mild incisional pain  ICU Vital Signs Last 24 Hrs  T(C): 36.8 (19 Jun 2021 03:00), Max: 37.1 (18 Jun 2021 07:00)  T(F): 98.3 (19 Jun 2021 03:00), Max: 98.8 (18 Jun 2021 07:00)  HR: 71 (19 Jun 2021 03:00) (69 - 106)  BP: 115/71 (19 Jun 2021 03:00) (105/64 - 144/86)  BP(mean): 82 (19 Jun 2021 03:00) (71 - 102)  ABP: --  ABP(mean): --  RR: 12 (19 Jun 2021 03:00) (10 - 24)  SpO2: 95% (19 Jun 2021 03:00) (93% - 100%)    AAO X 3  PERRLA, EOMI  Mild right facial droop, CN 2-12 otherwise grossly intact  CRUZ, Left UE/LE 5/5, Right UE 1/5, RLE 2/5  SILT    TIFFANY: 65cc    MEDICATIONS  (STANDING):  ceFAZolin   IVPB 2000 milliGRAM(s) IV Intermittent every 8 hours  famotidine    Tablet 20 milliGRAM(s) Oral every 12 hours  levETIRAcetam 500 milliGRAM(s) Oral every 12 hours  sodium chloride 0.9%. 1000 milliLiter(s) (75 mL/Hr) IV Continuous <Continuous>    MEDICATIONS  (PRN):  acetaminophen   Tablet .. 650 milliGRAM(s) Oral every 6 hours PRN Mild Pain (1 - 3)  HYDROmorphone  Injectable 0.5 milliGRAM(s) IV Push every 10 minutes PRN Moderate Pain (4 - 6)  HYDROmorphone  Injectable 1 milliGRAM(s) IV Push every 10 minutes PRN Severe Pain (7 - 10)  ondansetron Injectable 4 milliGRAM(s) IV Push every 6 hours PRN Nausea and/or Vomiting  ondansetron Injectable 4 milliGRAM(s) IV Push once PRN Nausea and/or Vomiting  oxyCODONE    IR 5 milliGRAM(s) Oral every 6 hours PRN Moderate Pain (4 10                          13.7   11.84 )-----------( 215      ( 18 Jun 2021 17:05 )             38.1     06-18    139  |  105  |  12  ----------------------------<  106<H>  4.5   |  23  |  0.93    Ca    9.1      18 Jun 2021 17:05  Phos  4.1     06-18  Mg     1.7     06-18

## 2021-06-27 NOTE — H&P ADULT - NSHPSOCIALHISTORY_GEN_ALL_CORE
SOCIAL HISTORY  Tobacco – denies  EtOH – denies  Drugs – Denies    lives with mom and brother in house with 4 stairs  (needed assistance for ambulation and stairs at home)  Initially went to rehab after TBI last year, on discharge was able to ambulate short household distances with hemiwalker and KAFO. Owns manual WC, which he is able to self-propel. Requires assistance for transfers and ambulation. +commode, +grab bars on bed, +ca walker    CURRENT FUNCTIONAL STATUS  Bed mobility – Roberth  Transfers – roberth  Ambulation – maxA x1 SOCIAL HISTORY  Tobacco – denies  EtOH – denies  Drugs – Denies    lives with mom and brother in house with 4 stairs  (needed assistance for ambulation and stairs at home)  Initially went to rehab after TBI last year, on discharge was able to ambulate short household distances with hemiwalker and KAFO. Owns manual WC, which he is able to self-propel. Requires assistance for transfers and ambulation. +commode, +grab bars on bed, +ca walker  Prior to injury, pt. was an  and   CURRENT FUNCTIONAL STATUS  Bed mobility – Roberth  Transfers – roberth  Ambulation – maxA x1

## 2021-06-27 NOTE — H&P ADULT - ASSESSMENT
Assessment/Plan:  TURNER HERNANDEZ is a 30M with PMH of TBI s/p left frontoparietal craniectomy (3/2020) presented to [facility] on 6/18/21 for scheduled left cranioplasty, with functional deficits      L cranioplasty  - hx TBI 3/2020 s/p L craniectomy, s/p PEG (removed), s/p trach (decannulated)  - Start comprehensive rehab program of PT/OT/SLP - 3 hours a day, 5 days a week. P&O as needed   - cont keppra 500mg q12h    HTN  - pt self dc'd medication 1 month ago  - Monitor BP    Pain  - Tylenol PRN  - Avoid sedating medications that may interfere with cognitive recovery    GI / Bowel  - senna 2 tab qhs  - dulcolax 5mg q12  - Miralax Daily  - GI ppx: famotidine BID     / Bladder  - Currently patient voids:      [ x] independent   - Continue bladder scans Q8 hours with straight cath for >400cc.  - Toileting schedule every 4 hours    Skin / Pressure injury  - Skin assessment on admission performed [  ] :   - Monitor Incisions:    - Turn q2 hours in bed while awake, air mattress  - nursing to monitor skin qShift  - soft heel protectors  - skin barrier cream PRN    Diet/Dysphagia:  - Diet Consistency: Regular  - Aspiration Precautions  - SLP consult for swallow function evaluation and treatment  - Nutrition consult    DVT prophylaxis:   - lovenox  - SCDs    Outpatient Follow-up:  Nely Conroy)  [facility] Neurosurgery  General  [address] [place] [address], [address]  [place] [address]  Phone: [phone]  Fax: [phone]  Follow Up Time: Routine        ---------------    Goals: Safe discharge to home  Estimated Length of Stay: 10-14 days  Rehab Potential: Good  Medical Prognosis: Good  Estimated Disposition: Home with home care      PRESCREEN COMPARISON:  I have reviewed the prescreen information and I have found no relevant changes between the preadmission screening and my post admission evaluation.    RATIONALE FOR INPATIENT ADMISSION: Patient demonstrates the following:  [X] Medically appropriate for rehabilitation admission  [X] Has attainable rehab goals with an appropriate initial discharge plan  [X]Has rehabilitation potential (expected to make a significant improvement within a reasonable period of time)  [X] Requires close medical management by a rehab physician, rehab nursing care, Hospitalist and comprehensive interdisciplinary team (including PT, OT and/or SLP, Prosthetics and Orthotics)     Assessment/Plan:  TURNER HERNANDEZ is a 30M with PMH of TBI s/p left frontoparietal craniectomy (3/2020) presented to [facility] on 6/18/21 for scheduled left cranioplasty, with right Hemiparesis, Spasticity, dysmetria, gait and aDL impairment.       L cranioplasty  - hx TBI 3/2020 s/p L craniectomy, s/p PEG (removed), s/p trach (decannulated)  - Start comprehensive rehab program of PT/OT/SLP - 3 hours a day, 5 days a week. P&O as needed   - cont keppra 500mg q12h    HTN  - pt self dc'd medication 1 month ago  - Monitor BP    Pain  - Tylenol PRN  - Avoid sedating medications that may interfere with cognitive recovery    GI / Bowel  - senna 2 tab qhs  - dulcolax 5mg q12  - Miralax Daily  - GI ppx: famotidine BID     / Bladder  - Currently patient voids:      [ x] independent   - Check PVR-- straight cath for >400cc.      Skin / Pressure injury  - Skin assessment on admission performed [  ] :   - Monitor Incisions:    - Turn q2 hours in bed while awake, air mattress  - nursing to monitor skin qShift  - soft heel protectors  - skin barrier cream PRN    Diet/Dysphagia:  - Diet Consistency: Regular  - Aspiration Precautions  - SLP consult for swallow function evaluation and treatment  - Nutrition consult    DVT prophylaxis:   - lovenox  - SCDs    Outpatient Follow-up:  Nely Conroy)  [facility] Neurosurgery  General  [address] [place] [address], [address]  [place] [address]  Phone: [phone]  Fax: [phone]  Follow Up Time: Routine        ---------------    Goals: Safe discharge to home  Estimated Length of Stay: 10-14 days  Rehab Potential: Good  Medical Prognosis: Good  Estimated Disposition: Home with home care      PRESCREEN COMPARISON:  I have reviewed the prescreen information and I have found no relevant changes between the preadmission screening and my post admission evaluation.    RATIONALE FOR INPATIENT ADMISSION: Patient demonstrates the following:  [X] Medically appropriate for rehabilitation admission  [X] Has attainable rehab goals with an appropriate initial discharge plan  [X]Has rehabilitation potential (expected to make a significant improvement within a reasonable period of time)  [X] Requires close medical management by a rehab physician, rehab nursing care, Hospitalist and comprehensive interdisciplinary team (including PT, OT and/or SLP, Prosthetics and Orthotics)

## 2021-06-28 DIAGNOSIS — S06.9X9A UNSPECIFIED INTRACRANIAL INJURY WITH LOSS OF CONSCIOUSNESS OF UNSPECIFIED DURATION, INITIAL ENCOUNTER: ICD-10-CM

## 2021-06-28 LAB
ALBUMIN SERPL ELPH-MCNC: 3.8 G/DL — SIGNIFICANT CHANGE UP (ref 3.3–5)
ALP SERPL-CCNC: 88 U/L — SIGNIFICANT CHANGE UP (ref 40–120)
ALT FLD-CCNC: 43 U/L — SIGNIFICANT CHANGE UP (ref 10–45)
ANION GAP SERPL CALC-SCNC: 6 MMOL/L — SIGNIFICANT CHANGE UP (ref 5–17)
AST SERPL-CCNC: 25 U/L — SIGNIFICANT CHANGE UP (ref 10–40)
BASOPHILS # BLD AUTO: 0.04 K/UL — SIGNIFICANT CHANGE UP (ref 0–0.2)
BASOPHILS NFR BLD AUTO: 0.7 % — SIGNIFICANT CHANGE UP (ref 0–2)
BILIRUB SERPL-MCNC: 0.4 MG/DL — SIGNIFICANT CHANGE UP (ref 0.2–1.2)
BUN SERPL-MCNC: 12 MG/DL — SIGNIFICANT CHANGE UP (ref 7–23)
CALCIUM SERPL-MCNC: 9 MG/DL — SIGNIFICANT CHANGE UP (ref 8.4–10.5)
CHLORIDE SERPL-SCNC: 104 MMOL/L — SIGNIFICANT CHANGE UP (ref 96–108)
CO2 SERPL-SCNC: 30 MMOL/L — SIGNIFICANT CHANGE UP (ref 22–31)
COVID-19 SPIKE DOMAIN AB INTERP: POSITIVE
COVID-19 SPIKE DOMAIN ANTIBODY RESULT: 83 U/ML — HIGH
CREAT SERPL-MCNC: 0.9 MG/DL — SIGNIFICANT CHANGE UP (ref 0.5–1.3)
EOSINOPHIL # BLD AUTO: 0.11 K/UL — SIGNIFICANT CHANGE UP (ref 0–0.5)
EOSINOPHIL NFR BLD AUTO: 2 % — SIGNIFICANT CHANGE UP (ref 0–6)
GLUCOSE SERPL-MCNC: 86 MG/DL — SIGNIFICANT CHANGE UP (ref 70–99)
HCT VFR BLD CALC: 39.9 % — SIGNIFICANT CHANGE UP (ref 39–50)
HGB BLD-MCNC: 14.2 G/DL — SIGNIFICANT CHANGE UP (ref 13–17)
IMM GRANULOCYTES NFR BLD AUTO: 0.2 % — SIGNIFICANT CHANGE UP (ref 0–1.5)
LYMPHOCYTES # BLD AUTO: 2.25 K/UL — SIGNIFICANT CHANGE UP (ref 1–3.3)
LYMPHOCYTES # BLD AUTO: 40.5 % — SIGNIFICANT CHANGE UP (ref 13–44)
MCHC RBC-ENTMCNC: 34.1 PG — HIGH (ref 27–34)
MCHC RBC-ENTMCNC: 35.6 GM/DL — SIGNIFICANT CHANGE UP (ref 32–36)
MCV RBC AUTO: 95.9 FL — SIGNIFICANT CHANGE UP (ref 80–100)
MONOCYTES # BLD AUTO: 0.34 K/UL — SIGNIFICANT CHANGE UP (ref 0–0.9)
MONOCYTES NFR BLD AUTO: 6.1 % — SIGNIFICANT CHANGE UP (ref 2–14)
NEUTROPHILS # BLD AUTO: 2.8 K/UL — SIGNIFICANT CHANGE UP (ref 1.8–7.4)
NEUTROPHILS NFR BLD AUTO: 50.5 % — SIGNIFICANT CHANGE UP (ref 43–77)
NRBC # BLD: 0 /100 WBCS — SIGNIFICANT CHANGE UP (ref 0–0)
PLATELET # BLD AUTO: 286 K/UL — SIGNIFICANT CHANGE UP (ref 150–400)
POTASSIUM SERPL-MCNC: 4 MMOL/L — SIGNIFICANT CHANGE UP (ref 3.5–5.3)
POTASSIUM SERPL-SCNC: 4 MMOL/L — SIGNIFICANT CHANGE UP (ref 3.5–5.3)
PROT SERPL-MCNC: 7.8 G/DL — SIGNIFICANT CHANGE UP (ref 6–8.3)
RBC # BLD: 4.16 M/UL — LOW (ref 4.2–5.8)
RBC # FLD: 11.6 % — SIGNIFICANT CHANGE UP (ref 10.3–14.5)
SARS-COV-2 IGG+IGM SERPL QL IA: 83 U/ML — HIGH
SARS-COV-2 IGG+IGM SERPL QL IA: POSITIVE
SODIUM SERPL-SCNC: 140 MMOL/L — SIGNIFICANT CHANGE UP (ref 135–145)
WBC # BLD: 5.55 K/UL — SIGNIFICANT CHANGE UP (ref 3.8–10.5)
WBC # FLD AUTO: 5.55 K/UL — SIGNIFICANT CHANGE UP (ref 3.8–10.5)

## 2021-06-28 PROCEDURE — 99223 1ST HOSP IP/OBS HIGH 75: CPT

## 2021-06-28 PROCEDURE — 99255 IP/OBS CONSLTJ NEW/EST HI 80: CPT

## 2021-06-28 RX ADMIN — FAMOTIDINE 20 MILLIGRAM(S): 10 INJECTION INTRAVENOUS at 05:57

## 2021-06-28 RX ADMIN — Medication 5 MILLIGRAM(S): at 21:41

## 2021-06-28 RX ADMIN — LEVETIRACETAM 500 MILLIGRAM(S): 250 TABLET, FILM COATED ORAL at 05:57

## 2021-06-28 NOTE — DIETITIAN INITIAL EVALUATION ADULT. - PERSON TAUGHT/METHOD
verbal instruction/patient instructed fiber and fluid for constipation/verbal instruction/patient instructed

## 2021-06-28 NOTE — DIETITIAN INITIAL EVALUATION ADULT. - PHYSCIAL ASSESSMENT
well nourished Pt appears well-nourished with good musculature; does not appear obese despite BMI >30

## 2021-06-28 NOTE — DIETITIAN INITIAL EVALUATION ADULT. - OTHER INFO
Pt reports good appetite since coming to the hospital. Pt likes the food in the hospital.  Pt reports being wt stable at 190-200 lbs for the past 10 years. Pt reports no nausea, vomiting, diarrhea, constipation prior to coming to the hospital. Pt reports constipation since coming to the hospital. Education on adequate fiber and fluid was provided. Last BM movement was on 6/27 per nursing flow sheets. No edema per nursing flow sheets. Pt was provided with hospital menu as reference for healthy meals. As per EMR: Pt is a 30M with PMH of TBI s/p left frontoparietal craniectomy (3/2020) presented to [facility] on 6/18/21 for scheduled left cranioplasty, with functional deficits  Pt reports good appetite since coming to the hospital. Pt likes the food in the hospital.  Pt reports being wt stable at 190-200 lbs for the past 10 years. Pt reports no nausea, vomiting, diarrhea, constipation prior to coming to the hospital. Pt reports constipation since coming to the hospital. Education on adequate fiber and fluid was provided. Last BM movement was on 6/27 per nursing flow sheets. No edema per nursing flow sheets. Pt was provided with hospital menu as reference for healthy meals. As per EMR: Pt is a 30M with PMH of TBI s/p left frontoparietal craniectomy (3/2020) presented to [facility] on 6/18/21 for scheduled left cranioplasty, with functional deficits  Pt reports good appetite since coming to the hospital. Pt likes the food in the hospital.  Pt reports being wt stable at 190-200 lbs for the past 10 years. Pt reports no nausea, vomiting, diarrhea, constipation prior to coming to the hospital. Pt reports constipation since admission. Education on adequate fiber and fluid was provided. Last BM movement was on 6/27 per nursing flow sheets. No edema per nursing flow sheets. Pt was provided with hospital menu as reference for healthy meals.

## 2021-06-28 NOTE — CONSULT NOTE ADULT - ASSESSMENT
31 y/o M PMH HTN, TBI s/p left frontoparietal craniectomy (3/2020) presented to [facility] on 6/18/21 for scheduled left frontoparietal cranioplasty, now with functional deficits admitted for inpatient rehab.    L frontalparietal cranioplasty  Right hemiparesis, wheelchair bound  -Hx TBI 3/2020 s/p L craniectomy, s/p PEG (removed), s/p trach (decannulated). drain removed 6/20/21.  -Continue comprehensive rehab program -PT/OT/SLP per rehab team  -Pain management, bowel regimen per rehab   -Keppra 500mg q12h for seizure ppx    HTN  -Pt reportedly self stopped meds 1 month ago  -Monitor BP    DVT ppx - lovenox  GI ppx: pepcid    29 y/o M PMH HTN, TBI s/p left frontoparietal craniectomy (3/2020) presented to [facility] on 6/18/21 for scheduled left frontoparietal cranioplasty, now with functional deficits admitted for inpatient rehab.    L frontalparietal cranioplasty  Right hemiparesis, wheelchair bound  -Hx TBI 3/2020 s/p L craniectomy, s/p PEG (removed), s/p trach (decannulated). drain removed 6/20/21.  -Continue comprehensive rehab program -PT/OT/SLP per rehab team  -Pain management, bowel regimen per rehab   -Keppra 500mg q12h for seizure ppx    HTN  -Currently controlled off meds, continue to monitor BP  -Pt endorses previously on BB, hx of tachycardia 2/2 dehydration    Constipation  -Encourage PO hydration   -Bowel regimen per rehab    DVT ppx - lovenox  GI ppx: pepcid
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
## 2021-06-28 NOTE — DIETITIAN INITIAL EVALUATION ADULT. - ADD RECOMMEND
1. Obtained food preferences; pt enjoys protein foods + cooked vegetables - will honor preferences to promote optimal macronutrient intake 2. Consider oral nutrition supplement if PO intake is inadequate, although pt reports he is tolerating diet with good PO intake 3. Will add high fiber foods (i.e. berries/prunes etc.)

## 2021-06-28 NOTE — DIETITIAN INITIAL EVALUATION ADULT. - PERTINENT MEDS FT
MEDICATIONS  (STANDING):  bisacodyl 5 milliGRAM(s) Oral every 12 hours  enoxaparin Injectable 40 milliGRAM(s) SubCutaneous at bedtime  famotidine    Tablet 20 milliGRAM(s) Oral every 12 hours  polyethylene glycol 3350 17 Gram(s) Oral daily  senna 2 Tablet(s) Oral at bedtime

## 2021-06-28 NOTE — DIETITIAN INITIAL EVALUATION ADULT. - ORAL INTAKE PTA/DIET HISTORY
Pt reports good intake prior to admission. Pt does a lot of strength training, and receives meal plans from online website. Pt reports consuming his wt in protein everyday, 100 oz of water, and a lot of greens for fiber. Diet recall reflects diet high in protein. Pt reports good intake prior to admission. Pt reports that he does a lot of strength training, and was following meal plans from an online website. Pt reports consuming his weight in protein everyday, drinks 100 oz of water, and consumes a lot of greens for fiber. Diet recall reflects diet high in protein.

## 2021-06-29 PROCEDURE — 99232 SBSQ HOSP IP/OBS MODERATE 35: CPT

## 2021-06-29 RX ADMIN — ENOXAPARIN SODIUM 40 MILLIGRAM(S): 100 INJECTION SUBCUTANEOUS at 21:03

## 2021-06-29 RX ADMIN — FAMOTIDINE 20 MILLIGRAM(S): 10 INJECTION INTRAVENOUS at 05:01

## 2021-06-29 RX ADMIN — POLYETHYLENE GLYCOL 3350 17 GRAM(S): 17 POWDER, FOR SOLUTION ORAL at 11:25

## 2021-06-29 RX ADMIN — Medication 650 MILLIGRAM(S): at 20:30

## 2021-06-29 RX ADMIN — Medication 650 MILLIGRAM(S): at 19:44

## 2021-06-29 NOTE — PROGRESS NOTE ADULT - ASSESSMENT
29 y/o M PMH HTN, TBI s/p left frontoparietal craniectomy (3/2020) presented to [facility] on 6/18/21 for scheduled left frontoparietal cranioplasty, now with functional deficits admitted for inpatient rehab.    L frontalparietal cranioplasty  Right hemiparesis, wheelchair bound  -Hx TBI 3/2020 s/p L craniectomy, s/p PEG (removed), s/p trach (decannulated). drain removed 6/20/21.  -Continue comprehensive rehab program - PT/OT/SLP per rehab team  -Pain management, bowel regimen per rehab   -Keppra 500mg q12h for seizure ppx    HTN  -Currently controlled off meds, continue to monitor BP  -Pt endorses previously on BB, hx of tachycardia 2/2 dehydration    Constipation  -Encourage PO hydration   -Bowel regimen per rehab    DVT ppx - lovenox  GI ppx: pepcid 29 y/o M PMH HTN, TBI s/p left frontoparietal craniectomy (3/2020) presented to [facility] on 6/18/21 for scheduled left frontoparietal cranioplasty, now with functional deficits admitted for inpatient rehab.    L frontalparietal cranioplasty  Right hemiparesis, wheelchair bound  -Hx TBI 3/2020 s/p L craniectomy, s/p PEG (removed), s/p trach (decannulated). drain removed 6/20/21.  -Continue comprehensive rehab program - PT/OT/SLP per rehab team  -Pain management, bowel regimen per rehab   -Keppra 500mg q12h for seizure ppx    HTN  -Currently controlled off meds, continue to monitor BP  -Pt endorses previously on BB, hx of tachycardia 2/2 dehydration    Constipation  -Encourage PO hydration   -Bowel regimen per rehab    DVT ppx - lovenox   GI ppx - pepcid

## 2021-06-29 NOTE — PROGRESS NOTE ADULT - SUBJECTIVE AND OBJECTIVE BOX
Patient is a 30y old  Male who presents with a chief complaint of s/p cranioplasty (28 Jun 2021 10:46)      Patient seen and examined at bedside. doing well, no acute medical complaints. refused lovenox for dvt ppx     ALLERGIES:  No Known Allergies    MEDICATIONS  (STANDING):  enoxaparin Injectable 40 milliGRAM(s) SubCutaneous at bedtime  famotidine    Tablet 20 milliGRAM(s) Oral every 12 hours  polyethylene glycol 3350 17 Gram(s) Oral daily  senna 2 Tablet(s) Oral at bedtime    MEDICATIONS  (PRN):  acetaminophen   Tablet .. 650 milliGRAM(s) Oral every 6 hours PRN Mild Pain (1 - 3)  bisacodyl 5 milliGRAM(s) Oral every 12 hours PRN Constipation    Vital Signs Last 24 Hrs  T(F): 98 (29 Jun 2021 07:38), Max: 98 (28 Jun 2021 07:46)  HR: 57 (29 Jun 2021 07:38) (57 - 77)  BP: 124/76 (29 Jun 2021 07:38) (105/67 - 124/76)  RR: 15 (29 Jun 2021 07:38) (15 - 15)  SpO2: 97% (29 Jun 2021 07:38) (97% - 98%)  I&O's Summary    28 Jun 2021 07:01  -  29 Jun 2021 07:00  --------------------------------------------------------  IN: 0 mL / OUT: 400 mL / NET: -400 mL      BMI (kg/m2): 30.3 (06-27-21 @ 14:58)    PHYSICAL EXAM:  General: NAD, A/O x 3  HEENT: MMM, no scleral icterus, +left cranio sutres, staple c/d/i  Neck: Supple, No JVD  Lungs: Respirations unlabored. Clear to auscultation bilaterally, no wheezes, rales, rhonchi  Cardio: RRR, S1/S2  Abdomen: Soft, Nontender, Nondistended; Bowel sounds present  Extremities: No calf tenderness, No pitting edema LE b/l. +Rt hemiparesis    LABS:                        14.2   5.55  )-----------( 286      ( 28 Jun 2021 05:00 )             39.9       06-28    140  |  104  |  12  ----------------------------<  86  4.0   |  30  |  0.90    Ca    9.0      28 Jun 2021 05:00    TPro  7.8  /  Alb  3.8  /  TBili  0.4  /  DBili  x   /  AST  25  /  ALT  43  /  AlkPhos  88  06-28     eGFR if Non African American: 115 mL/min/1.73M2 (06-28-21 @ 05:00)  eGFR if African American: 134 mL/min/1.73M2 (06-28-21 @ 05:00)    COVID-19 PCR: NotDetec (06-25-21 @ 17:37)    RADIOLOGY & ADDITIONAL TESTS: reviewed    Care Discussed with Consultants/Other Providers: yes - rehab

## 2021-06-29 NOTE — PROGRESS NOTE ADULT - ASSESSMENT
Assessment/Plan:  TURNER HERNANDEZ is a 30M with PMH of TBI s/p left frontoparietal craniectomy (3/2020) presented to [facility] on 6/18/21 for scheduled left cranioplasty, with right Hemiparesis, Spasticity, dysmetria, gait and aDL impairment.       L cranioplasty  - hx TBI 3/2020 s/p L craniectomy, s/p PEG (removed), s/p trach (decannulated)  - Cont.  comprehensive rehab program of PT/OT/SLP - 3 hours a day, 5 days a week. P&O as needed   - Stopped keppra as completed 7 day ppx course      Pain  - Tylenol PRN  - Avoid sedating medications that may interfere with cognitive recovery    GI / Bowel  - senna 2 tab qhs  - dulcolax 5mg q12  - Miralax Daily  - GI ppx: famotidine BID     / Bladder  -continent  -  PVR-low-- 70--d/c monitoring      Skin / Pressure injury  - Skin assessment on admission performed [  ] :   - Monitor Incisions:    - Turn q2 hours in bed while awake, air mattress  - nursing to monitor skin qShift  - soft heel protectors  - skin barrier cream PRN    Diet/Dysphagia:  - Diet Consistency: Regular  - Aspiration Precautions  - SLP consult for swallow function evaluation and treatment  - Nutrition consult    DVT prophylaxis:   - lovenox  - SCDs    IDT 6/29--  SW: resides with mother, and brother in [facility] 5h/d x 5d/week  OT: Sup. e; Min A groom; Tot A LBD; Mod A UBD;  Max A shower.  Goal Mod I e/g and Min A transf  PT: Mod A transf and Amb. 10ft Left KAFO. no stairs.  Goal Mod I transf and ambulation 150ft with Sup.  Speech: Reg/thins diet; decreased mastication, Tangential, decreased Attention and comprehension, Dysarthric.  Will order Vital Stim. in Therapy  ELOS: 7/20 home  Outpatient Follow-up:  Nely Conroy)  [facility] Neurosurgery  General  [address] [place] [address], [address]  [place] [address]  Phone: [phone]  Fax: [phone]  Follow Up Time: Routine        ---------------
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
## 2021-06-30 PROCEDURE — 99232 SBSQ HOSP IP/OBS MODERATE 35: CPT

## 2021-06-30 RX ADMIN — Medication 5 MILLIGRAM(S): at 17:42

## 2021-06-30 RX ADMIN — ENOXAPARIN SODIUM 40 MILLIGRAM(S): 100 INJECTION SUBCUTANEOUS at 22:08

## 2021-06-30 RX ADMIN — POLYETHYLENE GLYCOL 3350 17 GRAM(S): 17 POWDER, FOR SOLUTION ORAL at 11:33

## 2021-06-30 NOTE — PROGRESS NOTE ADULT - ASSESSMENT
Assessment/Plan:  TURNER HERNANDEZ is a 30M with PMH of TBI s/p left frontoparietal craniectomy (3/2020) presented to [facility] on 6/18/21 for scheduled left cranioplasty, with right Hemiparesis, Spasticity, dysmetria, gait and aDL impairment.       L cranioplasty  - hx TBI 3/2020 s/p L craniectomy, s/p PEG (removed), s/p trach (decannulated)  - Cont.  comprehensive rehab program of PT/OT/SLP - 3 hours a day, 5 days a week. P&O as needed   - Stopped keppra as completed 7 day ppx course      Pain  - Tylenol PRN  - Avoid sedating medications that may interfere with cognitive recovery    GI / Bowel  - senna 2 tab qhs  - dulcolax 5mg q12  - Miralax Daily  - GI ppx: famotidine BID     / Bladder  -continent  -  PVR-low-    Skin / Pressure injury  - Skin assessment on admission performed [  ] :   - Monitor Incisions:    - Turn q2 hours in bed while awake, air mattress  - nursing to monitor skin qShift  - soft heel protectors  - skin barrier cream PRN    Diet/Dysphagia:  - Diet Consistency: Regular  - Aspiration Precautions  - SLP consult for swallow function evaluation and treatment  - Nutrition consult    DVT prophylaxis:   - lovenox  - SCDs    IDT 6/29--  SW: resides with mother, and brother in [facility] 5h/d x 5d/week  OT: Sup. e; Min A groom; Tot A LBD; Mod A UBD;  Max A shower.  Goal Mod I e/g and Min A transf  PT: Mod A transf and Amb. 10ft Left KAFO. no stairs.  Goal Mod I transf and ambulation 150ft with Sup.  Speech: Reg/thins diet; decreased mastication, Tangential, decreased Attention and comprehension, Dysarthric.  Will order Vital Stim. in Therapy  ELOS: 7/20 home      Outpatient Follow-up:  Nely Conroy)  [facility] Neurosurgery  General  [address] [place] [address], [address]  [place] [address]  Phone: [phone]  Fax: [phone]  Follow Up Time: Routine        ---------------
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
## 2021-07-01 ENCOUNTER — APPOINTMENT (OUTPATIENT)
Dept: PHYSICAL MEDICINE AND REHAB | Facility: CLINIC | Age: 31
End: 2021-07-01

## 2021-07-01 LAB
ALBUMIN SERPL ELPH-MCNC: 4.2 G/DL — SIGNIFICANT CHANGE UP (ref 3.3–5)
ALP SERPL-CCNC: 87 U/L — SIGNIFICANT CHANGE UP (ref 40–120)
ALT FLD-CCNC: 39 U/L — SIGNIFICANT CHANGE UP (ref 10–45)
ANION GAP SERPL CALC-SCNC: 9 MMOL/L — SIGNIFICANT CHANGE UP (ref 5–17)
AST SERPL-CCNC: 20 U/L — SIGNIFICANT CHANGE UP (ref 10–40)
BASOPHILS # BLD AUTO: 0.05 K/UL — SIGNIFICANT CHANGE UP (ref 0–0.2)
BASOPHILS NFR BLD AUTO: 0.9 % — SIGNIFICANT CHANGE UP (ref 0–2)
BILIRUB SERPL-MCNC: 0.5 MG/DL — SIGNIFICANT CHANGE UP (ref 0.2–1.2)
BUN SERPL-MCNC: 16 MG/DL — SIGNIFICANT CHANGE UP (ref 7–23)
CALCIUM SERPL-MCNC: 9.5 MG/DL — SIGNIFICANT CHANGE UP (ref 8.4–10.5)
CHLORIDE SERPL-SCNC: 102 MMOL/L — SIGNIFICANT CHANGE UP (ref 96–108)
CO2 SERPL-SCNC: 28 MMOL/L — SIGNIFICANT CHANGE UP (ref 22–31)
CREAT SERPL-MCNC: 0.96 MG/DL — SIGNIFICANT CHANGE UP (ref 0.5–1.3)
EOSINOPHIL # BLD AUTO: 0.12 K/UL — SIGNIFICANT CHANGE UP (ref 0–0.5)
EOSINOPHIL NFR BLD AUTO: 2.1 % — SIGNIFICANT CHANGE UP (ref 0–6)
GLUCOSE SERPL-MCNC: 88 MG/DL — SIGNIFICANT CHANGE UP (ref 70–99)
HCT VFR BLD CALC: 40.3 % — SIGNIFICANT CHANGE UP (ref 39–50)
HGB BLD-MCNC: 14.5 G/DL — SIGNIFICANT CHANGE UP (ref 13–17)
IMM GRANULOCYTES NFR BLD AUTO: 0.2 % — SIGNIFICANT CHANGE UP (ref 0–1.5)
LYMPHOCYTES # BLD AUTO: 2.67 K/UL — SIGNIFICANT CHANGE UP (ref 1–3.3)
LYMPHOCYTES # BLD AUTO: 45.9 % — HIGH (ref 13–44)
MCHC RBC-ENTMCNC: 33.6 PG — SIGNIFICANT CHANGE UP (ref 27–34)
MCHC RBC-ENTMCNC: 36 GM/DL — SIGNIFICANT CHANGE UP (ref 32–36)
MCV RBC AUTO: 93.3 FL — SIGNIFICANT CHANGE UP (ref 80–100)
MONOCYTES # BLD AUTO: 0.39 K/UL — SIGNIFICANT CHANGE UP (ref 0–0.9)
MONOCYTES NFR BLD AUTO: 6.7 % — SIGNIFICANT CHANGE UP (ref 2–14)
NEUTROPHILS # BLD AUTO: 2.58 K/UL — SIGNIFICANT CHANGE UP (ref 1.8–7.4)
NEUTROPHILS NFR BLD AUTO: 44.2 % — SIGNIFICANT CHANGE UP (ref 43–77)
NRBC # BLD: 0 /100 WBCS — SIGNIFICANT CHANGE UP (ref 0–0)
PLATELET # BLD AUTO: 286 K/UL — SIGNIFICANT CHANGE UP (ref 150–400)
POTASSIUM SERPL-MCNC: 3.8 MMOL/L — SIGNIFICANT CHANGE UP (ref 3.5–5.3)
POTASSIUM SERPL-SCNC: 3.8 MMOL/L — SIGNIFICANT CHANGE UP (ref 3.5–5.3)
PROT SERPL-MCNC: 8.3 G/DL — SIGNIFICANT CHANGE UP (ref 6–8.3)
RBC # BLD: 4.32 M/UL — SIGNIFICANT CHANGE UP (ref 4.2–5.8)
RBC # FLD: 11.7 % — SIGNIFICANT CHANGE UP (ref 10.3–14.5)
SODIUM SERPL-SCNC: 139 MMOL/L — SIGNIFICANT CHANGE UP (ref 135–145)
WBC # BLD: 5.82 K/UL — SIGNIFICANT CHANGE UP (ref 3.8–10.5)
WBC # FLD AUTO: 5.82 K/UL — SIGNIFICANT CHANGE UP (ref 3.8–10.5)

## 2021-07-01 PROCEDURE — 99232 SBSQ HOSP IP/OBS MODERATE 35: CPT

## 2021-07-01 RX ADMIN — FAMOTIDINE 20 MILLIGRAM(S): 10 INJECTION INTRAVENOUS at 17:06

## 2021-07-01 RX ADMIN — Medication 5 MILLIGRAM(S): at 19:22

## 2021-07-01 RX ADMIN — FAMOTIDINE 20 MILLIGRAM(S): 10 INJECTION INTRAVENOUS at 05:20

## 2021-07-01 RX ADMIN — POLYETHYLENE GLYCOL 3350 17 GRAM(S): 17 POWDER, FOR SOLUTION ORAL at 11:39

## 2021-07-01 RX ADMIN — ENOXAPARIN SODIUM 40 MILLIGRAM(S): 100 INJECTION SUBCUTANEOUS at 21:08

## 2021-07-01 RX ADMIN — SENNA PLUS 2 TABLET(S): 8.6 TABLET ORAL at 21:08
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
## 2021-07-01 NOTE — PROGRESS NOTE ADULT - SUBJECTIVE AND OBJECTIVE BOX
Patient is a 30y old  Male who presents with a chief complaint of s/p cranioplasty (01 Jul 2021 10:36)      SUBJECTIVE / OVERNIGHT EVENTS:  Pt seen and examined at bedside. No acute events overnight.  Pt denies cp, palpitations, sob, abd pain, N/V, fever, chills.    ROS:  All other review of systems negative    Allergies    No Known Allergies    Intolerances        MEDICATIONS  (STANDING):  enoxaparin Injectable 40 milliGRAM(s) SubCutaneous at bedtime  famotidine    Tablet 20 milliGRAM(s) Oral every 12 hours  polyethylene glycol 3350 17 Gram(s) Oral daily  senna 2 Tablet(s) Oral at bedtime    MEDICATIONS  (PRN):  acetaminophen   Tablet .. 650 milliGRAM(s) Oral every 6 hours PRN Mild Pain (1 - 3)  bisacodyl 5 milliGRAM(s) Oral every 12 hours PRN Constipation      Vital Signs Last 24 Hrs  T(C): 36.7 (30 Jun 2021 21:45), Max: 36.7 (30 Jun 2021 21:45)  T(F): 98 (30 Jun 2021 21:45), Max: 98 (30 Jun 2021 21:45)  HR: 75 (01 Jul 2021 09:02) (67 - 75)  BP: 133/66 (01 Jul 2021 09:02) (124/76 - 133/66)  BP(mean): --  RR: 16 (01 Jul 2021 09:02) (16 - 16)  SpO2: 99% (01 Jul 2021 09:02) (99% - 99%)  CAPILLARY BLOOD GLUCOSE        I&O's Summary    30 Jun 2021 07:01  -  01 Jul 2021 07:00  --------------------------------------------------------  IN: 0 mL / OUT: 1300 mL / NET: -1300 mL        PHYSICAL EXAM:  GENERAL: NAD, well-developed  CHEST/LUNG: Clear to auscultation bilaterally; No wheeze, nonlabored breathing  HEART: Regular rate and rhythm; No murmurs, rubs, or gallops  ABDOMEN: Soft, Nontender, Nondistended; Bowel sounds present  EXTREMITIES:  2+ Peripheral Pulses, No clubbing, cyanosis, or edema  NEUROLOGY: AAOx3, right hemiparesis  PSYCH: calm, appropriate mood      LABS:                        14.5   5.82  )-----------( 286      ( 01 Jul 2021 07:03 )             40.3     07-01    139  |  102  |  16  ----------------------------<  88  3.8   |  28  |  0.96    Ca    9.5      01 Jul 2021 07:03    TPro  8.3  /  Alb  4.2  /  TBili  0.5  /  DBili  x   /  AST  20  /  ALT  39  /  AlkPhos  87  07-01              RADIOLOGY & ADDITIONAL TESTS:  Results Reviewed:   Imaging Personally Reviewed:  Electrocardiogram Personally Reviewed:    COORDINATION OF CARE:  Care Discussed with Consultants/Other Providers [Y/N]:  Prior or Outpatient Records Reviewed [Y/N]:

## 2021-07-01 NOTE — PROGRESS NOTE ADULT - ASSESSMENT
29 y/o M PMH HTN, TBI s/p left frontoparietal craniectomy (3/2020) presented to [facility] on 6/18/21 for scheduled left frontoparietal cranioplasty, now with functional deficits admitted for inpatient rehab.    L frontalparietal cranioplasty  Right hemiparesis, wheelchair bound  -Hx TBI 3/2020 s/p L craniectomy, s/p PEG (removed), s/p trach (decannulated). drain removed 6/20/21.  -Continue comprehensive rehab program - PT/OT/SLP per rehab team  -Keppra 500mg q12h for seizure ppx    HTN  -Normotensive off meds  -Monitor    DVT ppx - lovenox   GI ppx - pepcid

## 2021-07-01 NOTE — PROGRESS NOTE ADULT - ASSESSMENT
TURNER HERNANDEZ is a 30M with PMH of TBI s/p left frontoparietal craniectomy (3/2020) presented to [facility] on 6/18/21 for scheduled left cranioplasty, with right Hemiparesis, Spasticity, dysmetria, gait and aDL impairment.       L cranioplasty  - hx TBI 3/2020 s/p L craniectomy, s/p PEG (removed), s/p trach (decannulated)-incisions healing well.  - Cont.  comprehensive rehab program of PT/OT/SLP - 3 hours a day, 5 days a week. P&O as needed   - Off keppra-completed 7 day ppx course, no seizures overnight.      Pain  - Tylenol PRN  - Avoid sedating medications that may interfere with cognitive recovery    GI / Bowel  - senna 2 tab qhs  - add prune juice for constipation/no BM x2 days  - Miralax Daily  - GI ppx: famotidine BID     / Bladder  -continent  -  PVR-low-    Skin / Pressure injury  - Skin assessment on admission performed [  ] :   - Monitor Incisions:    - Turn q2 hours in bed while awake, air mattress  - nursing to monitor skin qShift  - soft heel protectors  - skin barrier cream PRN    Diet/Dysphagia:  - Diet Consistency: Regular  - Aspiration Precautions  - SLP consult for swallow function evaluation and treatment  - Nutrition consult    DVT prophylaxis:   - lovenox  - SCDs    IDT 6/29--  SW: resides with mother, and brother in [facility] 5h/d x 5d/week  OT: Sup. e; Min A groom; Tot A LBD; Mod A UBD;  Max A shower.  Goal Mod I e/g and Min A transf  PT: Mod A transf and Amb. 10ft Left KAFO. no stairs.  Goal Mod I transf and ambulation 150ft with Sup.  Speech: Reg/thins diet; decreased mastication, Tangential, decreased Attention and comprehension, Dysarthric.  Will order Vital Stim. in Therapy  ELOS: 7/20 home      Outpatient Follow-up:  Nely Conroy)  [facility] Neurosurgery  General  [address] [place] [address], [address]  [place] [address]  Phone: [phone]  Fax: [phone]  Follow Up Time: Routine        ---------------

## 2021-07-02 LAB — SARS-COV-2 RNA SPEC QL NAA+PROBE: SIGNIFICANT CHANGE UP

## 2021-07-02 PROCEDURE — 99232 SBSQ HOSP IP/OBS MODERATE 35: CPT

## 2021-07-02 RX ADMIN — ENOXAPARIN SODIUM 40 MILLIGRAM(S): 100 INJECTION SUBCUTANEOUS at 21:27

## 2021-07-02 RX ADMIN — FAMOTIDINE 20 MILLIGRAM(S): 10 INJECTION INTRAVENOUS at 05:36

## 2021-07-02 RX ADMIN — FAMOTIDINE 20 MILLIGRAM(S): 10 INJECTION INTRAVENOUS at 17:14

## 2021-07-02 RX ADMIN — Medication 10 MILLIGRAM(S): at 21:27

## 2021-07-02 RX ADMIN — Medication 1 ENEMA: at 10:55
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
## 2021-07-03 PROCEDURE — 99232 SBSQ HOSP IP/OBS MODERATE 35: CPT

## 2021-07-03 RX ADMIN — FAMOTIDINE 20 MILLIGRAM(S): 10 INJECTION INTRAVENOUS at 05:52

## 2021-07-03 RX ADMIN — ENOXAPARIN SODIUM 40 MILLIGRAM(S): 100 INJECTION SUBCUTANEOUS at 21:10

## 2021-07-03 RX ADMIN — SENNA PLUS 2 TABLET(S): 8.6 TABLET ORAL at 21:10
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
## 2021-07-03 NOTE — PROGRESS NOTE ADULT - ASSESSMENT
s/p L frontalparietal cranioplasty  PT/OT pre rehab    HT  well controlled  Monitor off of med    DVT ppx  Lovenox
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
## 2021-07-03 NOTE — PROGRESS NOTE ADULT - ASSESSMENT
TURNER HERNANDEZ is a 30M with PMH of TBI s/p left frontoparietal craniectomy (3/2020) presented to [facility] on 6/18/21 for scheduled left cranioplasty, with right Hemiparesis, Spasticity, dysmetria, gait and aDL impairment.       L cranioplasty  - hx TBI 3/2020 s/p L craniectomy, s/p PEG (removed), s/p trach (decannulated)-incisions healing well.  - Cont.  comprehensive rehab program of PT/OT/SLP - 3 hours a day, 5 days a week. P&O as needed   --K-taping for right shoulder ordered in OT    Pain  - Tylenol PRN  - Avoid sedating medications that may interfere with cognitive recovery    GI / Bowel  --sTAT fleet enema given with successful BM 7/2  --PRN bisacodyl  10mg   - senna 2 tab qhs  -  prune juice   - Miralax Daily  - GI ppx: famotidine BID     / Bladder  -continent  -  PVR-low-    Skin / Pressure injury  - Skin assessment on admission performed [  ] :   - Monitor Incisions:    - Turn q2 hours in bed while awake, air mattress  - nursing to monitor skin qShift  - soft heel protectors  - skin barrier cream PRN    Diet/Dysphagia:  - Diet Consistency: Regular  - Aspiration Precautions  - SLP consult for swallow function evaluation and treatment  - Nutrition consult    DVT prophylaxis:   - lovenox  - SCDs    IDT 6/29--  SW: resides with mother, and brother in [facility] 5h/d x 5d/week  OT: Sup. e; Min A groom; Tot A LBD; Mod A UBD;  Max A shower.  Goal Mod I e/g and Min A transf  PT: Mod A transf and Amb. 10ft Left KAFO. no stairs.  Goal Mod I transf and ambulation 150ft with Sup.  Speech: Reg/thins diet; decreased mastication, Tangential, decreased Attention and comprehension, Dysarthric.  Will order Vital Stim. in Therapy  ELOS: 7/20 home      Outpatient Follow-up:  Nely Conroy)  [facility] Neurosurgery  General  [address] [place] [address], [address]  [place] [address]  Phone: [phone]  Fax: [phone]  Follow Up Time: Routine        ---------------

## 2021-07-04 PROCEDURE — 99232 SBSQ HOSP IP/OBS MODERATE 35: CPT

## 2021-07-04 RX ADMIN — POLYETHYLENE GLYCOL 3350 17 GRAM(S): 17 POWDER, FOR SOLUTION ORAL at 11:56

## 2021-07-04 RX ADMIN — ENOXAPARIN SODIUM 40 MILLIGRAM(S): 100 INJECTION SUBCUTANEOUS at 22:52

## 2021-07-04 RX ADMIN — Medication 650 MILLIGRAM(S): at 08:33

## 2021-07-04 RX ADMIN — Medication 10 MILLIGRAM(S): at 22:23

## 2021-07-04 RX ADMIN — Medication 650 MILLIGRAM(S): at 09:33
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
## 2021-07-04 NOTE — PROGRESS NOTE ADULT - ASSESSMENT
s/p L frontalparietal cranioplasty  PT/OT pre rehab    HTN  well controlled  Monitor off of med    DVT ppx  Lovenox
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
## 2021-07-05 LAB
ALBUMIN SERPL ELPH-MCNC: 3.7 G/DL — SIGNIFICANT CHANGE UP (ref 3.3–5)
ALP SERPL-CCNC: 85 U/L — SIGNIFICANT CHANGE UP (ref 40–120)
ALT FLD-CCNC: 36 U/L — SIGNIFICANT CHANGE UP (ref 10–45)
ANION GAP SERPL CALC-SCNC: 3 MMOL/L — LOW (ref 5–17)
AST SERPL-CCNC: 17 U/L — SIGNIFICANT CHANGE UP (ref 10–40)
BASOPHILS # BLD AUTO: 0.05 K/UL — SIGNIFICANT CHANGE UP (ref 0–0.2)
BASOPHILS NFR BLD AUTO: 0.9 % — SIGNIFICANT CHANGE UP (ref 0–2)
BILIRUB SERPL-MCNC: 0.4 MG/DL — SIGNIFICANT CHANGE UP (ref 0.2–1.2)
BUN SERPL-MCNC: 10 MG/DL — SIGNIFICANT CHANGE UP (ref 7–23)
CALCIUM SERPL-MCNC: 9.1 MG/DL — SIGNIFICANT CHANGE UP (ref 8.4–10.5)
CHLORIDE SERPL-SCNC: 104 MMOL/L — SIGNIFICANT CHANGE UP (ref 96–108)
CO2 SERPL-SCNC: 31 MMOL/L — SIGNIFICANT CHANGE UP (ref 22–31)
CREAT SERPL-MCNC: 0.95 MG/DL — SIGNIFICANT CHANGE UP (ref 0.5–1.3)
EOSINOPHIL # BLD AUTO: 0.14 K/UL — SIGNIFICANT CHANGE UP (ref 0–0.5)
EOSINOPHIL NFR BLD AUTO: 2.5 % — SIGNIFICANT CHANGE UP (ref 0–6)
GLUCOSE SERPL-MCNC: 91 MG/DL — SIGNIFICANT CHANGE UP (ref 70–99)
HCT VFR BLD CALC: 40 % — SIGNIFICANT CHANGE UP (ref 39–50)
HGB BLD-MCNC: 14 G/DL — SIGNIFICANT CHANGE UP (ref 13–17)
IMM GRANULOCYTES NFR BLD AUTO: 0.2 % — SIGNIFICANT CHANGE UP (ref 0–1.5)
LYMPHOCYTES # BLD AUTO: 2.33 K/UL — SIGNIFICANT CHANGE UP (ref 1–3.3)
LYMPHOCYTES # BLD AUTO: 41.8 % — SIGNIFICANT CHANGE UP (ref 13–44)
MCHC RBC-ENTMCNC: 33.5 PG — SIGNIFICANT CHANGE UP (ref 27–34)
MCHC RBC-ENTMCNC: 35 GM/DL — SIGNIFICANT CHANGE UP (ref 32–36)
MCV RBC AUTO: 95.7 FL — SIGNIFICANT CHANGE UP (ref 80–100)
MONOCYTES # BLD AUTO: 0.43 K/UL — SIGNIFICANT CHANGE UP (ref 0–0.9)
MONOCYTES NFR BLD AUTO: 7.7 % — SIGNIFICANT CHANGE UP (ref 2–14)
NEUTROPHILS # BLD AUTO: 2.61 K/UL — SIGNIFICANT CHANGE UP (ref 1.8–7.4)
NEUTROPHILS NFR BLD AUTO: 46.9 % — SIGNIFICANT CHANGE UP (ref 43–77)
NRBC # BLD: 0 /100 WBCS — SIGNIFICANT CHANGE UP (ref 0–0)
PLATELET # BLD AUTO: 265 K/UL — SIGNIFICANT CHANGE UP (ref 150–400)
POTASSIUM SERPL-MCNC: 4 MMOL/L — SIGNIFICANT CHANGE UP (ref 3.5–5.3)
POTASSIUM SERPL-SCNC: 4 MMOL/L — SIGNIFICANT CHANGE UP (ref 3.5–5.3)
PROT SERPL-MCNC: 7.6 G/DL — SIGNIFICANT CHANGE UP (ref 6–8.3)
RBC # BLD: 4.18 M/UL — LOW (ref 4.2–5.8)
RBC # FLD: 11.9 % — SIGNIFICANT CHANGE UP (ref 10.3–14.5)
SODIUM SERPL-SCNC: 138 MMOL/L — SIGNIFICANT CHANGE UP (ref 135–145)
WBC # BLD: 5.57 K/UL — SIGNIFICANT CHANGE UP (ref 3.8–10.5)
WBC # FLD AUTO: 5.57 K/UL — SIGNIFICANT CHANGE UP (ref 3.8–10.5)

## 2021-07-05 PROCEDURE — 99232 SBSQ HOSP IP/OBS MODERATE 35: CPT

## 2021-07-05 RX ADMIN — ENOXAPARIN SODIUM 40 MILLIGRAM(S): 100 INJECTION SUBCUTANEOUS at 21:44

## 2021-07-05 RX ADMIN — Medication 10 MILLIGRAM(S): at 21:44

## 2021-07-05 NOTE — PROGRESS NOTE ADULT - ASSESSMENT
TURNER HERNANDEZ is a 30M with PMH of TBI s/p left frontoparietal craniectomy (3/2020) presented to [facility] on 6/18/21 for scheduled left cranioplasty, with right Hemiparesis, Spasticity, dysmetria, gait and aDL impairment.       L cranioplasty  - hx TBI 3/2020 s/p L craniectomy, s/p PEG (removed), s/p trach (decannulated)-incisions healing well.  - Cont.  comprehensive rehab program of PT/OT/SLP - 3 hours a day, 5 days a week. P&O as needed   --K-taping for right shoulder ordered in OT    Pain  - Tylenol PRN  - Avoid sedating medications that may interfere with cognitive recovery    GI / Bowel  --PRN bisacodyl  10mg   - senna 2 tab qhs  -  prune juice   - Miralax Daily  - GI ppx: famotidine BID     / Bladder  -continent  -  PVR-low-    Skin / Pressure injury  - Monitor Incisions:    - Turn q2 hours in bed while awake, air mattress  - nursing to monitor skin qShift  - soft heel protectors  - skin barrier cream PRN    Diet/Dysphagia:  - Diet Consistency: Regular  - Aspiration Precautions  - SLP consult for swallow function evaluation and treatment  - Nutrition consult    DVT prophylaxis:   - lovenox--Educated on purpose for injection.  All questions answered.  - SCDs    IDT 6/29--  SW: resides with mother, and brother in [facility] 5h/d x 5d/week  OT: Sup. e; Min A groom; Tot A LBD; Mod A UBD;  Max A shower.  Goal Mod I e/g and Min A transf  PT: Mod A transf and Amb. 10ft Left KAFO. no stairs.  Goal Mod I transf and ambulation 150ft with Sup.  Speech: Reg/thins diet; decreased mastication, Tangential, decreased Attention and comprehension, Dysarthric.  Will order Vital Stim. in Therapy  ELOS: 7/20 home      Outpatient Follow-up:  Nely Conroy)  [facility] Neurosurgery  General  [address] [place] [address], [address]  [place] [address]  Phone: [phone]  Fax: [phone]  Follow Up Time: Routine        ---------------
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
## 2021-07-06 PROCEDURE — 99232 SBSQ HOSP IP/OBS MODERATE 35: CPT

## 2021-07-06 PROCEDURE — 99233 SBSQ HOSP IP/OBS HIGH 50: CPT

## 2021-07-06 RX ORDER — SENNA PLUS 8.6 MG/1
2 TABLET ORAL AT BEDTIME
Refills: 0 | Status: DISCONTINUED | OUTPATIENT
Start: 2021-07-06 | End: 2021-07-21

## 2021-07-06 RX ORDER — TIZANIDINE 4 MG/1
2 TABLET ORAL AT BEDTIME
Refills: 0 | Status: DISCONTINUED | OUTPATIENT
Start: 2021-07-06 | End: 2021-07-16

## 2021-07-06 RX ORDER — POLYETHYLENE GLYCOL 3350 17 G/17G
17 POWDER, FOR SOLUTION ORAL
Refills: 0 | Status: DISCONTINUED | OUTPATIENT
Start: 2021-07-06 | End: 2021-07-12

## 2021-07-06 RX ADMIN — Medication 650 MILLIGRAM(S): at 09:18

## 2021-07-06 RX ADMIN — Medication 10 MILLIGRAM(S): at 21:13

## 2021-07-06 RX ADMIN — Medication 650 MILLIGRAM(S): at 10:05

## 2021-07-06 RX ADMIN — ENOXAPARIN SODIUM 40 MILLIGRAM(S): 100 INJECTION SUBCUTANEOUS at 21:13

## 2021-07-06 RX ADMIN — TIZANIDINE 2 MILLIGRAM(S): 4 TABLET ORAL at 21:13
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
## 2021-07-06 NOTE — PROGRESS NOTE ADULT - ASSESSMENT
TRUNER HERNANDEZ is a 30M with PMH of TBI s/p left frontoparietal craniectomy (3/2020) presented to [facility] on 6/18/21 for scheduled left cranioplasty, with right Hemiparesis, Spasticity, dysmetria, gait and aDL impairment.       L cranioplasty  - hx TBI 3/2020 s/p L craniectomy, s/p PEG (removed), s/p trach (decannulated)-incisions healing well.  - Cont.  comprehensive rehab program of PT/OT/SLP - 3 hours a day, 5 days a week. P&O as needed   --K-taping for right shoulder ordered in OT    Pain  - Tylenol PRN  - Avoid sedating medications that may interfere with cognitive recovery    GI / Bowel  --change bisacodyl  10mg to scheduled  - senna 2 tab qhs--change to PRN  -  prune juice   - Miralax Daily  - GI ppx: famotidine BID     / Bladder  -continent  -  PVR-low-    Skin / Pressure injury  - Monitor Incisions:    - Turn q2 hours in bed while awake, air mattress  - nursing to monitor skin qShift  - soft heel protectors  - skin barrier cream PRN    Diet/Dysphagia:  - Diet Consistency: Regular  - Aspiration Precautions  - SLP consult for swallow function evaluation and treatment  - Nutrition consult    DVT prophylaxis:   - lovenox--Educated on purpose for injection.  All questions answered.  - SCDs    IDT 7/6--  SW: resides with mother, and brother in [facility] 5h/d x 5d/week  OT: Sup. e/ UBD; Min A groom/LBD; fluctuates Mod to Max A transfers.  Goal Mod I e/g and Min A transf  PT: Min A transf; Mod A Amb. 20ft HC. Mod A 2 steps.  Goal Mod I transf and ambulation 150ft with Sup.  Speech: Reg/thins diet; mild receptive language and cognitive deficits, Tangential, decreased Attention, Dysarthric. Vital stim in therapy  ELOS: 7/20 home      Outpatient Follow-up:  Nely Conroy)  [facility] Neurosurgery  General  [address] [place] [address], [address]  [place] [address]  Phone: [phone]  Fax: [phone]  Follow Up Time: Routine        ---------------
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
## 2021-07-06 NOTE — PROGRESS NOTE ADULT - ASSESSMENT
29 y/o M PMH HTN, TBI s/p left frontoparietal craniectomy (3/2020) presented to [facility] on 6/18/21 for scheduled left frontoparietal cranioplasty, now with functional deficits admitted for inpatient rehab.    L frontalparietal cranioplasty  Right hemiparesis, wheelchair bound  -Hx TBI 3/2020 s/p L craniectomy, s/p PEG (removed), s/p trach (decannulated). drain removed 6/20/21.  -Continue comprehensive rehab program - PT/OT/SLP per rehab team  -Keppra 500mg q12h for seizure ppx  - Seizure, fall, aspiration precautions    HTN  -Normotensive off meds  -Monitor    DVT ppx - lovenox   GI ppx - pepcid    d/w Dr. Romero     will follow.    31 y/o M PMH HTN, TBI s/p left frontoparietal craniectomy (3/2020) presented to [facility] on 6/18/21 for scheduled left frontoparietal cranioplasty, now with functional deficits admitted for inpatient rehab.    L frontalparietal cranioplasty  Right hemiparesis, wheelchair bound  -Hx TBI 3/2020 s/p L craniectomy, s/p PEG (removed), s/p trach (decannulated). drain removed 6/20/21.  -Continue comprehensive rehab program - PT/OT/SLP per rehab team  -Keppra 500mg q12h for seizure ppx  - Seizure, fall, aspiration precautions    Headaches  - Consider to repeat CT head.   - pain control as per primary    HTN  -Normotensive off meds  -Monitor    Constipation  - increased Miralax to BID 7/6  - dulcolax/Enema PRN  - kike at bedtime  - encouraged Hydration    DVT ppx - lovenox   GI ppx - pepcid    d/w Dr. Romero     will follow.

## 2021-07-07 PROCEDURE — 99232 SBSQ HOSP IP/OBS MODERATE 35: CPT

## 2021-07-07 RX ADMIN — TIZANIDINE 2 MILLIGRAM(S): 4 TABLET ORAL at 21:32

## 2021-07-07 RX ADMIN — ENOXAPARIN SODIUM 40 MILLIGRAM(S): 100 INJECTION SUBCUTANEOUS at 21:32

## 2021-07-07 RX ADMIN — SENNA PLUS 2 TABLET(S): 8.6 TABLET ORAL at 21:37

## 2021-07-07 NOTE — PROGRESS NOTE ADULT - SUBJECTIVE AND OBJECTIVE BOX
Medicine Progress Note    Patient is a 30y old  Male who presents with a chief complaint of s/p cranioplasty (05 Jul 2021 10:43)      SUBJECTIVE / OVERNIGHT EVENTS:  seen and examined  Chart reviewed  No overnight events  Limb weakness improving with therapy  BM this am  no headaches today. gets headaches mostly in am when hungry    ADDITIONAL REVIEW OF SYSTEMS:  no fever/chills/CP/sob/palpitation/dizziness/ nausea/vomiting/diarrhea. all other ROS neg    MEDICATIONS  (STANDING):  bisacodyl 10 milliGRAM(s) Oral daily  enoxaparin Injectable 40 milliGRAM(s) SubCutaneous at bedtime  famotidine    Tablet 20 milliGRAM(s) Oral every 12 hours  polyethylene glycol 3350 17 Gram(s) Oral two times a day  tiZANidine 2 milliGRAM(s) Oral at bedtime    MEDICATIONS  (PRN):  acetaminophen   Tablet .. 650 milliGRAM(s) Oral every 6 hours PRN Mild Pain (1 - 3)  senna 2 Tablet(s) Oral at bedtime PRN Constipation  PHYSICAL EXAM:    Vital Signs Last 24 Hrs  T(C): 36.6 (07 Jul 2021 07:34), Max: 36.9 (06 Jul 2021 21:10)  T(F): 97.9 (07 Jul 2021 07:34), Max: 98.4 (06 Jul 2021 21:10)  HR: 64 (07 Jul 2021 07:34) (64 - 65)  BP: 112/76 (07 Jul 2021 07:34) (112/76 - 122/78)  BP(mean): --  RR: 16 (07 Jul 2021 07:34) (16 - 16)  SpO2: 98% (07 Jul 2021 07:34) (97% - 98%)      GENERAL: Not in distress. Alert    HEENT: AT/NC. clear conjuctiva, MMM.     CARDIOVASCULAR: RRR S1, S2. No murmur/rubs/gallop  LUNGS: BLAE+, no rales, no wheezing, no rhonchi.    ABDOMEN: ND. Soft,  NT, no guarding / rebound / rigidity. BS normoactive. No CVA tenderness.    BACK: No spine tenderness.  EXTREMITIES: no edema. no leg or calf TP.  SKIN: no rash.   NEUROLOGIC: AAO*3 , right sided weakness. left facial deviation speech fluent.    PSYCHIATRIC: Calm.  No agitation.    LABS:                        14.0   5.57  )-----------( 265      ( 05 Jul 2021 05:00 )             40.0     07-05    138  |  104  |  10  ----------------------------<  91  4.0   |  31  |  0.95    Ca    9.1      05 Jul 2021 05:00    TPro  7.6  /  Alb  3.7  /  TBili  0.4  /  DBili  x   /  AST  17  /  ALT  36  /  AlkPhos  85  07-05              COVID-19 PCR: NotDetec (02 Jul 2021 05:00)  COVID-19 PCR: NotDetec (25 Jun 2021 17:37)      RADIOLOGY & ADDITIONAL TESTS:  Imaging from Last 24 Hours:    Electrocardiogram/QTc Interval:    COORDINATION OF CARE:  Care Discussed with Consultants/Other Providers:
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
## 2021-07-07 NOTE — PROGRESS NOTE ADULT - ASSESSMENT
31 y/o M PMH HTN, TBI s/p left frontoparietal craniectomy (3/2020) presented to [facility] on 6/18/21 for scheduled left frontoparietal cranioplasty, now with functional deficits admitted for inpatient rehab.    L frontalparietal cranioplasty  Right hemiparesis, wheelchair bound  -Hx TBI 3/2020 s/p L craniectomy, s/p PEG (removed), s/p trach (decannulated). drain removed 6/20/21.  -Continue comprehensive rehab program - PT/OT/SLP per rehab team  -Keppra 500mg q12h for seizure ppx  - Seizure, fall, aspiration precautions    Headaches  - Consider to repeat CT head.   - pain control as per primary    HTN  -Normotensive off meds  -Monitor    Constipation  - imroved  - c/w Miralax BID  - dulcolax/Enema PRN  - senna at bedtime  - encouraged Hydration    DVT ppx - lovenox   GI ppx - pepcid    d/w Dr. Romero     will follow.

## 2021-07-07 NOTE — PROGRESS NOTE ADULT - ASSESSMENT
TURNER HERNANDEZ is a 30M with PMH of TBI s/p left frontoparietal craniectomy (3/2020) presented to [facility] on 6/18/21 for scheduled left cranioplasty, with right Hemiparesis, Spasticity, dysmetria, gait and aDL impairment.       L cranioplasty  - hx TBI 3/2020 s/p L craniectomy, s/p PEG (removed), s/p trach (decannulated)-incisions healing well.  - Cont.  comprehensive rehab program of PT/OT/SLP - 3 hours a day, 5 days a week. P&O as needed   --K-taping for right shoulder ordered in OT    Spasticity  --Trial Tizanidine 2mg qhs    Pain  - Tylenol PRN  - Avoid sedating medications that may interfere with cognitive recovery    GI / Bowel  - bisacodyl  10mg   - senna 2 tab qhs- PRN  -  prune juice   - Miralax Daily  - GI ppx: famotidine BID     / Bladder  -continent  -  PVR-low-    Skin / Pressure injury  - Monitor Incisions:    - Turn q2 hours in bed while awake, air mattress  - nursing to monitor skin qShift  - soft heel protectors  - skin barrier cream PRN    Diet/Dysphagia:  - Diet Consistency: Regular  - Aspiration Precautions  - SLP consult for swallow function evaluation and treatment  - Nutrition consult    DVT prophylaxis:   - lovenox--  - SCDs    IDT 7/6--  SW: resides with mother, and brothe  r in , [place] 5h/d x 5d/week  OT: Sup. e/ UBD; Min A groom/LBD; fluctuates Mod to Max A transfers.  Goal Mod I e/g and Min A transf  PT: Min A transf; Mod A Amb. 20ft HC. Mod A 2 steps.  Goal Mod I transf and ambulation 150ft with Sup.  Speech: Reg/thins diet; mild receptive language and cognitive deficits, Tangential, decreased Attention, Dysarthric. Vital stim in therapy  ELOS: 7/20 home      Outpatient Follow-up:  Nely Conroy)  [facility] Neurosurgery  General  [address] [place] [address], [address]  [place] [address]  Phone: [phone]  Fax: [phone]  Follow Up Time: Routine        ---------------

## 2021-07-08 PROCEDURE — 99232 SBSQ HOSP IP/OBS MODERATE 35: CPT

## 2021-07-08 RX ADMIN — TIZANIDINE 2 MILLIGRAM(S): 4 TABLET ORAL at 21:23

## 2021-07-08 RX ADMIN — ENOXAPARIN SODIUM 40 MILLIGRAM(S): 100 INJECTION SUBCUTANEOUS at 21:22

## 2021-07-08 RX ADMIN — Medication 5 MILLIGRAM(S): at 21:23

## 2021-07-08 NOTE — PROGRESS NOTE ADULT - ASSESSMENT
TURNER HERNANDEZ is a 30M with PMH of TBI s/p left frontoparietal craniectomy (3/2020) presented to [facility] on 6/18/21 for scheduled left cranioplasty, with right Hemiparesis, Spasticity, dysmetria, gait and aDL impairment.       L cranioplasty  - hx TBI 3/2020 s/p L craniectomy, s/p PEG (removed), s/p trach (decannulated)-incisions healing well.  - Cont.  comprehensive rehab program of PT/OT/SLP - 3 hours a day, 5 days a week. P&O as needed   --K-taping for right shoulder ordered in OT    Spasticity  --Trial Tizanidine 2mg qhs    Pain  - Tylenol PRN  - Avoid sedating medications that may interfere with cognitive recovery    GI / Bowel  - bisacodyl  10mg   - senna 2 tab qhs- PRN  -  prune juice   - Miralax Daily  - GI ppx: famotidine BID     / Bladder  -continent  -  PVR-low-    Skin / Pressure injury  - Monitor Incisions:    - Turn q2 hours in bed while awake, air mattress  - nursing to monitor skin qShift  - soft heel protectors  - skin barrier cream PRN    Diet/Dysphagia:  - Diet Consistency: Regular  - Aspiration Precautions  - SLP consult for swallow function evaluation and treatment  - Nutrition consult    DVT prophylaxis:   - lovenox--  - SCDs    IDT 7/6--  SW: resides with mother, and brothe  r in , [place] 5h/d x 5d/week  OT: Sup. e/ UBD; Min A groom/LBD; fluctuates Mod to Max A transfers.  Goal Mod I e/g and Min A transf  PT: Min A transf; Mod A Amb. 20ft HC. Mod A 2 steps.  Goal Mod I transf and ambulation 150ft with Sup.  Speech: Reg/thins diet; mild receptive language and cognitive deficits, Tangential, decreased Attention, Dysarthric. Vital stim in therapy  ELOS: 7/20 home      Outpatient Follow-up:  Nely Conroy)  [facility] Neurosurgery  General  [address] [place] [address], [address]  [place] [address]  Phone: [phone]  Fax: [phone]  Follow Up Time: Routine        ---------------   TURNER HERNANDEZ is a 30M with PMH of TBI s/p left frontoparietal craniectomy (3/2020) presented to [facility] on 6/18/21 for scheduled left cranioplasty, with right Hemiparesis, Spasticity, dysmetria, gait and aDL impairment.       L cranioplasty  - hx TBI 3/2020 s/p L craniectomy, s/p PEG (removed), s/p trach (decannulated)-incisions healing well.  - Cont.  comprehensive rehab program of PT/OT/SLP - 3 hours a day, 5 days a week. P&O as needed   --K-taping for right shoulder ordered in OT    Spasticity  --Trial Tizanidine 2mg qhs. Patient is tolerating it well. Seems to be helping during PT.     Pain  - Tylenol PRN  - Avoid sedating medications that may interfere with cognitive recovery    GI / Bowel  - bisacodyl  10mg   - senna 2 tab qhs- PRN  -  prune juice   - Miralax Daily  - GI ppx: famotidine BID     / Bladder  -continent  -  PVR-low-    Skin / Pressure injury  - Monitor Incisions:    - Turn q2 hours in bed while awake, air mattress  - nursing to monitor skin qShift  - soft heel protectors  - skin barrier cream PRN    Diet/Dysphagia:  - Diet Consistency: Regular  - Aspiration Precautions  - SLP consult for swallow function evaluation and treatment  - Nutrition consult    DVT prophylaxis:   - lovenox--  - SCDs    IDT 7/6--  SW: resides with mother, and brothe  r in , [place] 5h/d x 5d/week  OT: Sup. e/ UBD; Min A groom/LBD; fluctuates Mod to Max A transfers.  Goal Mod I e/g and Min A transf  PT: Min A transf; Mod A Amb. 20ft HC. Mod A 2 steps.  Goal Mod I transf and ambulation 150ft with Sup.  Speech: Reg/thins diet; mild receptive language and cognitive deficits, Tangential, decreased Attention, Dysarthric. Vital stim in therapy  ELOS: 7/20 home      Outpatient Follow-up:  Nely Conroy)  [facility] Neurosurgery  General  [address] [place] [address], [address]  [place] [address]  Phone: [phone]  Fax: [phone]  Follow Up Time: Routine        ---------------   TURNER HERNANDEZ is a 30M with PMH of TBI s/p left frontoparietal craniectomy (3/2020) presented to [facility] on 6/18/21 for scheduled left cranioplasty, with right Hemiparesis, Spasticity, dysmetria, gait and aDL impairment.       L cranioplasty  - hx TBI 3/2020 s/p L craniectomy, s/p PEG (removed), s/p trach (decannulated)-incisions healing well.  - Cont.  comprehensive rehab program of PT/OT/SLP - 3 hours a day, 5 days a week. P&O as needed   --K-taping for right shoulder ordered in OT    Spasticity  --cont. Tizanidine 2mg qhs. Patient is tolerating it well. Seems to be helping during PT.     Pain  - Tylenol PRN  - Avoid sedating medications that may interfere with cognitive recovery    GI / Bowel  - bisacodyl  10mg   - senna 2 tab qhs- PRN  -  prune juice   - Miralax Daily  - GI ppx: famotidine BID     / Bladder  -continent  -  PVR-low-    Skin / Pressure injury  - Monitor Incisions:    - Turn q2 hours in bed while awake, air mattress  - nursing to monitor skin qShift  - soft heel protectors  - skin barrier cream PRN    Diet/Dysphagia:  - Diet Consistency: Regular  - Aspiration Precautions  - SLP consult for swallow function evaluation and treatment  - Nutrition consult    DVT prophylaxis:   - lovenox--  - SCDs    IDT 7/6--  SW: resides with mother, and brothe  r in , [place] 5h/d x 5d/week  OT: Sup. e/ UBD; Min A groom/LBD; fluctuates Mod to Max A transfers.  Goal Mod I e/g and Min A transf  PT: Min A transf; Mod A Amb. 20ft HC. Mod A 2 steps.  Goal Mod I transf and ambulation 150ft with Sup.  Speech: Reg/thins diet; mild receptive language and cognitive deficits, Tangential, decreased Attention, Dysarthric. Vital stim in therapy  ELOS: 7/20 home      Outpatient Follow-up:  Nely Conroy)  [facility] Neurosurgery  General  [address] [place] [address], [address]  [place] [address]  Phone: [phone]  Fax: [phone]  Follow Up Time: Routine        ---------------
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
## 2021-07-09 PROCEDURE — 99232 SBSQ HOSP IP/OBS MODERATE 35: CPT

## 2021-07-09 PROCEDURE — 99232 SBSQ HOSP IP/OBS MODERATE 35: CPT | Mod: GC

## 2021-07-09 RX ADMIN — ENOXAPARIN SODIUM 40 MILLIGRAM(S): 100 INJECTION SUBCUTANEOUS at 21:34

## 2021-07-09 RX ADMIN — Medication 5 MILLIGRAM(S): at 21:34

## 2021-07-09 RX ADMIN — TIZANIDINE 2 MILLIGRAM(S): 4 TABLET ORAL at 21:34

## 2021-07-09 NOTE — PROGRESS NOTE ADULT - SUBJECTIVE AND OBJECTIVE BOX
30y old  Male who presents with a chief complaint of s/p cranioplasty     seen at the bedside, c/o brief episode of lightheadedness this am after therapy, resolved spontaneously no n/v, no sob, no chest pain.     Vital Signs Last 24 Hrs  T(C): 36.7 (09 Jul 2021 08:41), Max: 36.7 (08 Jul 2021 19:45)  T(F): 98 (09 Jul 2021 08:41), Max: 98 (08 Jul 2021 19:45)  HR: 66 (09 Jul 2021 08:41) (66 - 69)  BP: 117/73 (09 Jul 2021 08:41) (111/70 - 117/73)  BP(mean): --  RR: 14 (09 Jul 2021 08:41) (14 - 15)  SpO2: 99% (09 Jul 2021 08:41) (97% - 99%)      GENERAL- NAD  EAR/NOSE/MOUTH/THROAT - no pharyngeal exudates, no oral leisions,  MMM  EYES- CLARE, conjunctiva and Sclera clear  NECK- supple  RESPIRATORY-  clear to auscultation bilaterally, non laboured breathing  CARDIOVASCULAR - SIS2, RRR  GI - soft NT BS present  EXTREMITIES- no pedal edema  NEUROLOGY- right sided weakness  SKIN- no rashes, warm to touch  PSYCHIATRY- AAO X 3    MEDICATIONS  (STANDING):  bisacodyl 5 milliGRAM(s) Oral at bedtime  enoxaparin Injectable 40 milliGRAM(s) SubCutaneous at bedtime  famotidine    Tablet 20 milliGRAM(s) Oral every 12 hours  polyethylene glycol 3350 17 Gram(s) Oral two times a day  tiZANidine 2 milliGRAM(s) Oral at bedtime    MEDICATIONS  (PRN):  acetaminophen   Tablet .. 650 milliGRAM(s) Oral every 6 hours PRN Mild Pain (1 - 3)  senna 2 Tablet(s) Oral at bedtime PRN Constipation

## 2021-07-09 NOTE — PROGRESS NOTE ADULT - ASSESSMENT
TURNER HERNANDEZ is a 30M with PMH of TBI s/p left frontoparietal craniectomy (3/2020) presented to [facility] on 6/18/21 for scheduled left cranioplasty, with right Hemiparesis, Spasticity, dysmetria, gait and aDL impairment.       L cranioplasty  - hx TBI 3/2020 s/p L craniectomy, s/p PEG (removed), s/p trach (decannulated)-incisions healing well.  - Cont.  comprehensive rehab program of PT/OT/SLP - 3 hours a day, 5 days a week. P&O as needed   --K-taping for right shoulder ordered in OT    Spasticity  --cont. Tizanidine 2mg qhs. Patient is tolerating it well. Seems to be helping during PT. Plan on increasing the dose next week if patient tolerates it well.     Pain  - Tylenol PRN  - Avoid sedating medications that may interfere with cognitive recovery    GI / Bowel  - 10mg bisacodyl qhs  - senna 2 tab qhs- PRN  - prune juice   - Miralax Daily  - GI ppx: famotidine BID     / Bladder  -continent  -  PVR-low-    Skin / Pressure injury  - Monitor Incisions:    - Turn q2 hours in bed while awake, air mattress  - nursing to monitor skin qShift  - soft heel protectors  - skin barrier cream PRN    Diet/Dysphagia:  - Diet Consistency: Regular  - Aspiration Precautions  - SLP consult for swallow function evaluation and treatment  - Nutrition consult    DVT prophylaxis:   - lovenox--  - SCDs    IDT 7/6--  SW: resides with mother, and brothe  [place] in [place], [place] 5h/d x 5d/week  OT: Sup. e/ UBD; Min A groom/LBD; fluctuates Mod to Max A transfers.  Goal Mod I e/g and Min A transf  PT: Min A transf; Mod A Amb. 20ft HC. Mod A 2 steps.  Goal Mod I transf and ambulation 150ft with Sup.  Speech: Reg/thins diet; mild receptive language and cognitive deficits, Tangential, decreased Attention, Dysarthric. Vital stim in therapy  ELOS: 7/20 home    Outpatient Follow-up:  Nely Conroy)  [facility] Neurosurgery  General  [address] [place] [address], [address]  [place] [address]  Phone: [phone]  Fax: [phone]  Follow Up Time: Routine  ---------------

## 2021-07-09 NOTE — ASU PREOP CHECKLIST - HAIR REMOVAL
Patient and family requesting call regarding CT head results  Patient was last seen in the office on 06/10/2021 for acute on chronic subdural hematoma status post fall in mid April, previously on Coumadin for atrial fibrillation which has been held since his fall  In that last note it was documented that patient would be moving down to [place] to be closer to family and was recommended at that time that patient would set himself up with a neurologist/neurosurgeon for continued follow-up  It was also discussed that if patient would never want any surgical intervention even if imaging were to worsen or patient were to decline neurologically that it may not be beneficial to continue with serial imaging as it would likely not  overall  On 06/24/2021 another provider in the office received a call from the radiologist down in [place] stating that patient had a very large subdural hematoma which appeared chronic however at that time we were unable to review CT imaging as it was not available  The provider at that time had discussion with the patient's daughter and wife and they decided that they would likely not want any surgical intervention if this subdural were to worsen  I was able to review imaging with patient, wife and sister over the phone that was obtained from the [place] radiology site  Overall I feel imaging is grossly stable with large right-sided subdural hematoma that is grossly chronic, it may slightly be worse but I feel overall it has not changed in size  Again I discussed the patient's wishes that should he decline neurologically or imaging reveal worsening hemorrhage he would still opts for no surgical intervention  Given this, patient does not necessarily need to get serial imaging for surveillance although if they would like to establish himself with a neurosurgeon they may  They should call the office should they have any questions regarding patient's care    They are very grateful for the phone call  hair removal not indicated

## 2021-07-09 NOTE — PROGRESS NOTE ADULT - ASSESSMENT
29 y/o M PMH HTN, TBI s/p left frontoparietal craniectomy (3/2020) presented to [facility] on 6/18/21 for scheduled left frontoparietal cranioplasty, now with functional deficits admitted for inpatient rehab- pt/ot/dvt ppx    mild lightheadedness monitor, doubt new neuro event    L frontoparietal cranioplasty  -Keppra for sz ppx    HTN  -Normotensive off meds  -Monitor    Constipation  - improved  - c/w Miralax BID  - dulcolax/Enema PRN  - senna at bedtime  - encouraged Hydration    DVT ppx - Lovenox     GI ppx - Pepcid    d/w Dr. Romero   will follow.

## 2021-07-10 PROCEDURE — 99232 SBSQ HOSP IP/OBS MODERATE 35: CPT

## 2021-07-10 PROCEDURE — 99233 SBSQ HOSP IP/OBS HIGH 50: CPT

## 2021-07-10 RX ORDER — MUPIROCIN 20 MG/G
1 OINTMENT TOPICAL THREE TIMES A DAY
Refills: 0 | Status: DISCONTINUED | OUTPATIENT
Start: 2021-07-10 | End: 2021-07-14

## 2021-07-10 RX ADMIN — Medication 5 MILLIGRAM(S): at 22:09

## 2021-07-10 RX ADMIN — TIZANIDINE 2 MILLIGRAM(S): 4 TABLET ORAL at 21:26

## 2021-07-10 RX ADMIN — Medication 5 MILLIGRAM(S): at 21:26

## 2021-07-10 RX ADMIN — MUPIROCIN 1 APPLICATION(S): 20 OINTMENT TOPICAL at 21:26

## 2021-07-10 RX ADMIN — MUPIROCIN 1 APPLICATION(S): 20 OINTMENT TOPICAL at 16:40

## 2021-07-10 RX ADMIN — ENOXAPARIN SODIUM 40 MILLIGRAM(S): 100 INJECTION SUBCUTANEOUS at 21:27

## 2021-07-10 NOTE — PROGRESS NOTE ADULT - ASSESSMENT
29 y/o M PMH HTN, TBI s/p left frontoparietal craniectomy (3/2020) presented to [facility] on 6/18/21 for scheduled left frontoparietal cranioplasty, now with functional deficits admitted for inpatient rehab- pt/ot/dvt ppx    L frontoparietal cranioplasty  -Keppra for sz ppx    Left middle finger paronychia   -No drainable abscess on evaluation   -Warm compress/warm water soaks 10-15min TID , then apply mupirocin   -If unimproved, or new abscess, can start PO keflex     HTN  -Normotensive off meds  -Monitor    Constipation  - improved  - c/w Miralax BID  - dulcolax/Enema PRN  - senna at bedtime  - encouraged Hydration    DVT ppx - Lovenox   GI ppx - Pepcid

## 2021-07-10 NOTE — PROGRESS NOTE ADULT - SUBJECTIVE AND OBJECTIVE BOX
Patient is a 30y old  Male who presents with a chief complaint of s/p cranioplasty (09 Jul 2021 12:21)      Patient seen and examined at bedside. c/o left middle finger nail bed swelling, mild pain x3 days. no open wound/pus denies headache, fever, chills, cp, sob, n/v, abd pain, worsening numbness/tingling in the extremities.    ALLERGIES:  No Known Allergies    MEDICATIONS  (STANDING):  bisacodyl 5 milliGRAM(s) Oral at bedtime  enoxaparin Injectable 40 milliGRAM(s) SubCutaneous at bedtime  famotidine    Tablet 20 milliGRAM(s) Oral every 12 hours  polyethylene glycol 3350 17 Gram(s) Oral two times a day  tiZANidine 2 milliGRAM(s) Oral at bedtime    MEDICATIONS  (PRN):  acetaminophen   Tablet .. 650 milliGRAM(s) Oral every 6 hours PRN Mild Pain (1 - 3)  senna 2 Tablet(s) Oral at bedtime PRN Constipation    Vital Signs Last 24 Hrs  T(F): 98.2 (10 Jul 2021 07:37), Max: 98.2 (10 Jul 2021 07:37)  HR: 67 (10 Jul 2021 07:37) (67 - 71)  BP: 113/73 (10 Jul 2021 07:37) (113/73 - 128/77)  RR: 14 (10 Jul 2021 07:37) (14 - 14)  SpO2: 98% (10 Jul 2021 07:37) (98% - 99%)  I&O's Summary    10 Jul 2021 07:01  -  10 Jul 2021 12:53  --------------------------------------------------------  IN: 300 mL / OUT: 0 mL / NET: 300 mL    PHYSICAL EXAM:  General: NAD, A/O x 3  ENT: MMM, no scleral icterus  Neck: Supple, No JVD  Lungs: Clear to auscultation bilaterally, no wheezes, rales, rhonchi  Cardio: RRR, S1/S2, No murmurs  Abdomen: Soft, Nontender, Nondistended; Bowel sounds present  Extremities: No calf tenderness, No pitting edema LE b/l. +left middle finger paronychia     LABS:    COVID-19 PCR: NotDetec (07-02-21 @ 05:00)  COVID-19 PCR: NotDetec (06-25-21 @ 17:37)    RADIOLOGY & ADDITIONAL TESTS: reviewed    Care Discussed with Consultants/Other Providers: yes - rehab
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
## 2021-07-11 PROCEDURE — 99232 SBSQ HOSP IP/OBS MODERATE 35: CPT

## 2021-07-11 RX ADMIN — MUPIROCIN 1 APPLICATION(S): 20 OINTMENT TOPICAL at 22:01

## 2021-07-11 RX ADMIN — ENOXAPARIN SODIUM 40 MILLIGRAM(S): 100 INJECTION SUBCUTANEOUS at 22:01

## 2021-07-11 RX ADMIN — MUPIROCIN 1 APPLICATION(S): 20 OINTMENT TOPICAL at 13:51

## 2021-07-11 RX ADMIN — TIZANIDINE 2 MILLIGRAM(S): 4 TABLET ORAL at 22:01

## 2021-07-11 RX ADMIN — MUPIROCIN 1 APPLICATION(S): 20 OINTMENT TOPICAL at 06:41

## 2021-07-11 RX ADMIN — Medication 10 MILLIGRAM(S): at 22:01

## 2021-07-11 NOTE — PROGRESS NOTE ADULT - ASSESSMENT
29 y/o M PMH HTN, TBI s/p left frontoparietal craniectomy (3/2020) presented to [facility] on 6/18/21 for scheduled left frontoparietal cranioplasty, now with functional deficits admitted for inpatient rehab- pt/ot/dvt ppx    L frontoparietal cranioplasty  -Keppra for sz ppx    Left middle finger paronychia - improving  -No drainable abscess on evaluation   -Warm compress/warm water soaks 10-15min TID , then apply mupirocin   -If unimproved, or new abscess, can start PO keflex     HTN  -Normotensive off meds  -Monitor    Constipation  - improved  - c/w Miralax BID  - dulcolax/Enema PRN  - senna at bedtime  - encouraged Hydration    DVT ppx - Lovenox   GI ppx - Pepcid
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
## 2021-07-11 NOTE — PROGRESS NOTE ADULT - SUBJECTIVE AND OBJECTIVE BOX
Patient is a 30y old  Male who presents with a chief complaint of s/p cranioplasty (09 Jul 2021 12:21)      Patient seen and examined at bedside. finger pain improving. no acute medical complaints.     ALLERGIES:  No Known Allergies    MEDICATIONS  (STANDING):  bisacodyl 5 milliGRAM(s) Oral at bedtime  enoxaparin Injectable 40 milliGRAM(s) SubCutaneous at bedtime  famotidine    Tablet 20 milliGRAM(s) Oral every 12 hours  polyethylene glycol 3350 17 Gram(s) Oral two times a day  tiZANidine 2 milliGRAM(s) Oral at bedtime    MEDICATIONS  (PRN):  acetaminophen   Tablet .. 650 milliGRAM(s) Oral every 6 hours PRN Mild Pain (1 - 3)  senna 2 Tablet(s) Oral at bedtime PRN Constipation    Vital Signs Last 24 Hrs  T(C): 36.6 (10 Jul 2021 20:30), Max: 36.6 (10 Jul 2021 20:30)  T(F): 97.8 (10 Jul 2021 20:30), Max: 97.8 (10 Jul 2021 20:30)  HR: 80 (10 Jul 2021 20:30) (80 - 80)  BP: 116/73 (10 Jul 2021 20:30) (116/73 - 116/73)  BP(mean): --  RR: 14 (10 Jul 2021 20:30) (14 - 14)  SpO2: 97% (10 Jul 2021 20:30) (97% - 97%)  I&O's Summary    10 Jul 2021 07:01  -  10 Jul 2021 12:53  --------------------------------------------------------  IN: 300 mL / OUT: 0 mL / NET: 300 mL    PHYSICAL EXAM:  General: NAD, A/O x 3  ENT: MMM, no scleral icterus  Neck: Supple, No JVD  Lungs: Clear to auscultation bilaterally, no wheezes, rales, rhonchi  Cardio: RRR, S1/S2, No murmurs  Abdomen: Soft, Nontender, Nondistended; Bowel sounds present  Extremities: No calf tenderness, No pitting edema LE b/l. +left middle finger paronychia - improving    LABS:    COVID-19 PCR: NotDetec (07-02-21 @ 05:00)  COVID-19 PCR: NotDetec (06-25-21 @ 17:37)    RADIOLOGY & ADDITIONAL TESTS: reviewed    Care Discussed with Consultants/Other Providers: yes - rehab

## 2021-07-12 LAB
ALBUMIN SERPL ELPH-MCNC: 3.6 G/DL — SIGNIFICANT CHANGE UP (ref 3.3–5)
ALP SERPL-CCNC: 82 U/L — SIGNIFICANT CHANGE UP (ref 40–120)
ALT FLD-CCNC: 41 U/L — SIGNIFICANT CHANGE UP (ref 10–45)
ANION GAP SERPL CALC-SCNC: 8 MMOL/L — SIGNIFICANT CHANGE UP (ref 5–17)
AST SERPL-CCNC: 18 U/L — SIGNIFICANT CHANGE UP (ref 10–40)
BASOPHILS # BLD AUTO: 0.05 K/UL — SIGNIFICANT CHANGE UP (ref 0–0.2)
BASOPHILS NFR BLD AUTO: 1 % — SIGNIFICANT CHANGE UP (ref 0–2)
BILIRUB SERPL-MCNC: 0.4 MG/DL — SIGNIFICANT CHANGE UP (ref 0.2–1.2)
BUN SERPL-MCNC: 9 MG/DL — SIGNIFICANT CHANGE UP (ref 7–23)
CALCIUM SERPL-MCNC: 9.1 MG/DL — SIGNIFICANT CHANGE UP (ref 8.4–10.5)
CHLORIDE SERPL-SCNC: 104 MMOL/L — SIGNIFICANT CHANGE UP (ref 96–108)
CO2 SERPL-SCNC: 28 MMOL/L — SIGNIFICANT CHANGE UP (ref 22–31)
CREAT SERPL-MCNC: 0.95 MG/DL — SIGNIFICANT CHANGE UP (ref 0.5–1.3)
EOSINOPHIL # BLD AUTO: 0.13 K/UL — SIGNIFICANT CHANGE UP (ref 0–0.5)
EOSINOPHIL NFR BLD AUTO: 2.6 % — SIGNIFICANT CHANGE UP (ref 0–6)
GLUCOSE SERPL-MCNC: 88 MG/DL — SIGNIFICANT CHANGE UP (ref 70–99)
HCT VFR BLD CALC: 38.6 % — LOW (ref 39–50)
HGB BLD-MCNC: 13.7 G/DL — SIGNIFICANT CHANGE UP (ref 13–17)
IMM GRANULOCYTES NFR BLD AUTO: 0.4 % — SIGNIFICANT CHANGE UP (ref 0–1.5)
LYMPHOCYTES # BLD AUTO: 2.14 K/UL — SIGNIFICANT CHANGE UP (ref 1–3.3)
LYMPHOCYTES # BLD AUTO: 43.2 % — SIGNIFICANT CHANGE UP (ref 13–44)
MCHC RBC-ENTMCNC: 33.5 PG — SIGNIFICANT CHANGE UP (ref 27–34)
MCHC RBC-ENTMCNC: 35.5 GM/DL — SIGNIFICANT CHANGE UP (ref 32–36)
MCV RBC AUTO: 94.4 FL — SIGNIFICANT CHANGE UP (ref 80–100)
MONOCYTES # BLD AUTO: 0.37 K/UL — SIGNIFICANT CHANGE UP (ref 0–0.9)
MONOCYTES NFR BLD AUTO: 7.5 % — SIGNIFICANT CHANGE UP (ref 2–14)
NEUTROPHILS # BLD AUTO: 2.24 K/UL — SIGNIFICANT CHANGE UP (ref 1.8–7.4)
NEUTROPHILS NFR BLD AUTO: 45.3 % — SIGNIFICANT CHANGE UP (ref 43–77)
NRBC # BLD: 0 /100 WBCS — SIGNIFICANT CHANGE UP (ref 0–0)
PLATELET # BLD AUTO: 224 K/UL — SIGNIFICANT CHANGE UP (ref 150–400)
POTASSIUM SERPL-MCNC: 4.1 MMOL/L — SIGNIFICANT CHANGE UP (ref 3.5–5.3)
POTASSIUM SERPL-SCNC: 4.1 MMOL/L — SIGNIFICANT CHANGE UP (ref 3.5–5.3)
PROT SERPL-MCNC: 7.4 G/DL — SIGNIFICANT CHANGE UP (ref 6–8.3)
RBC # BLD: 4.09 M/UL — LOW (ref 4.2–5.8)
RBC # FLD: 11.6 % — SIGNIFICANT CHANGE UP (ref 10.3–14.5)
SODIUM SERPL-SCNC: 140 MMOL/L — SIGNIFICANT CHANGE UP (ref 135–145)
WBC # BLD: 4.95 K/UL — SIGNIFICANT CHANGE UP (ref 3.8–10.5)
WBC # FLD AUTO: 4.95 K/UL — SIGNIFICANT CHANGE UP (ref 3.8–10.5)

## 2021-07-12 PROCEDURE — 99232 SBSQ HOSP IP/OBS MODERATE 35: CPT

## 2021-07-12 RX ADMIN — MUPIROCIN 1 APPLICATION(S): 20 OINTMENT TOPICAL at 22:17

## 2021-07-12 RX ADMIN — SENNA PLUS 2 TABLET(S): 8.6 TABLET ORAL at 22:17

## 2021-07-12 RX ADMIN — TIZANIDINE 2 MILLIGRAM(S): 4 TABLET ORAL at 22:17

## 2021-07-12 RX ADMIN — ENOXAPARIN SODIUM 40 MILLIGRAM(S): 100 INJECTION SUBCUTANEOUS at 22:17

## 2021-07-12 RX ADMIN — MUPIROCIN 1 APPLICATION(S): 20 OINTMENT TOPICAL at 15:05

## 2021-07-12 RX ADMIN — MUPIROCIN 1 APPLICATION(S): 20 OINTMENT TOPICAL at 05:35

## 2021-07-12 NOTE — PROGRESS NOTE ADULT - ASSESSMENT
TURNER HERNANDEZ is a 30M with PMH of TBI s/p left frontoparietal craniectomy (3/2020) presented to [facility] on 6/18/21 for scheduled left cranioplasty, with right Hemiparesis, Spasticity, dysmetria, gait and aDL impairment.       L cranioplasty  - hx TBI 3/2020 s/p L craniectomy, s/p PEG (removed), s/p trach (decannulated)-incisions healing well.  - Cont.  comprehensive rehab program of PT/OT/SLP - 3 hours a day, 5 days a week. P&O as needed   --K-taping for right shoulder ordered in OT    Spasticity  --cont. Tizanidine 2mg qhs. Patient is tolerating it well. Seems to be helping during PT. Plan on increasing the dose next week if patient tolerates it well.     Pain  - Tylenol PRN  - Avoid sedating medications that may interfere with cognitive recovery    GI / Bowel  - States the constipation is better.   - Complains of nausea. Stopped famotidine & bisacodyl.  - senna 2 tab qhs- PRN  - prune juice        / Bladder  -continent  -  PVR-low-    Skin / Pressure injury  - Monitor Incisions:    - Turn q2 hours in bed while awake, air mattress  - nursing to monitor skin qShift  - soft heel protectors  - skin barrier cream PRN    Diet/Dysphagia:  - Diet Consistency: Regular  - Aspiration Precautions  - SLP consult for swallow function evaluation and treatment  - Nutrition consult    DVT prophylaxis:   - lovenox--  - SCDs    IDT 7/6--  SW: resides with mother, and brothe  r in [place] [facility] 5h/d x 5d/week  OT: Sup. e/ UBD; Min A groom/LBD; fluctuates Mod to Max A transfers.  Goal Mod I e/g and Min A transf  PT: Min A transf; Mod A Amb. 20ft HC. Mod A 2 steps.  Goal Mod I transf and ambulation 150ft with Sup.  Speech: Reg/thins diet; mild receptive language and cognitive deficits, Tangential, decreased Attention, Dysarthric. Vital stim in therapy  ELOS: 7/20 home    Outpatient Follow-up:  Nely Conroy)  [facility] Neurosurgery  General  [address] [place] [address], [address]  [place] [address]  Phone: [phone]  Fax: [phone]  Follow Up Time: Routine  ---------------
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
## 2021-07-13 PROCEDURE — 99232 SBSQ HOSP IP/OBS MODERATE 35: CPT

## 2021-07-13 RX ADMIN — SENNA PLUS 2 TABLET(S): 8.6 TABLET ORAL at 21:46

## 2021-07-13 RX ADMIN — TIZANIDINE 2 MILLIGRAM(S): 4 TABLET ORAL at 21:43

## 2021-07-13 RX ADMIN — MUPIROCIN 1 APPLICATION(S): 20 OINTMENT TOPICAL at 06:32

## 2021-07-13 RX ADMIN — ENOXAPARIN SODIUM 40 MILLIGRAM(S): 100 INJECTION SUBCUTANEOUS at 21:43

## 2021-07-13 NOTE — PROGRESS NOTE ADULT - ASSESSMENT
TURNER HERNANDEZ is a 30M with PMH of TBI s/p left frontoparietal craniectomy (3/2020) presented to [facility] on 6/18/21 for scheduled left cranioplasty, with right Hemiparesis, Spasticity, dysmetria, gait and aDL impairment.       L cranioplasty  - hx TBI 3/2020 s/p L craniectomy, s/p PEG (removed), s/p trach (decannulated)-incisions healing well.  - Cont.  comprehensive rehab program of PT/OT/SLP - 3 hours a day, 5 days a week. P&O as needed   --K-taping for right shoulder ordered in OT    Spasticity  --cont. Tizanidine 2mg qhs. Patient is tolerating it well. Seems to be helping during PT.    Pain  - Tylenol PRN  - Avoid sedating medications that may interfere with cognitive recovery    GI / Bowel  - States the constipation is better.   - Complains of nausea. Stopped famotidine & bisacodyl.  - senna 2 tab qhs- PRN  - prune juice        / Bladder  -continent  - PVR-low-    Skin / Pressure injury  - Monitor Incisions:    - Turn q2 hours in bed while awake, air mattress  - nursing to monitor skin qShift  - soft heel protectors  - skin barrier cream PRN    Diet/Dysphagia:  - Diet Consistency: Regular  - Aspiration Precautions  - SLP consult for swallow function evaluation and treatment  - Nutrition consult    DVT prophylaxis:   - lovenox--  - SCDs    IDT 7/13--  SW: resides with mother, and brother in [facility] 5h/d x 5d/week  OT: Sup. e/ UBD; Min A groom/LBD; fluctuates Mod to Max A transfers. Mod A with toilet. Goal Min & supervision I e/d and Min A transf  PT: Min A transf; Mod A Amb. 30ft HC. Mod A/Max A 2 steps.  Goal Mod I transf and ambulation 150ft with Sup. Using KAFO, needs assist to advance leg.  Speech: Reg/thins diet; mild receptive language and cognitive deficits, Tangential, decreased Attention, Dysarthric. Good Awareness. Vital stim in therapy  ELOS: 7/28 home    Outpatient Follow-up:  Nely Conroy)  [facility] Neurosurgery  General  [address] [place] [address], [address]  [place] [address]  Phone: [phone]  Fax: [phone]  Follow Up Time: Routine  --------------- TURNER HERNANDEZ is a 30M with PMH of TBI s/p left frontoparietal craniectomy (3/2020) presented to [facility] on 6/18/21 for scheduled left cranioplasty, with right Hemiparesis, Spasticity, dysmetria, gait and aDL impairment.       L cranioplasty  - hx TBI 3/2020 s/p L craniectomy, s/p PEG (removed), s/p trach (decannulated)-incisions healing well.  - Cont.  comprehensive rehab program of PT/OT/SLP - 3 hours a day, 5 days a week. P&O as needed   --K-taping for right shoulder ordered in OT    Spasticity  --cont. Tizanidine 2mg qhs. Patient is tolerating it well. Seems to be helping during PT.    Pain  - Tylenol PRN  - Avoid sedating medications that may interfere with cognitive recovery    GI / Bowel  - States the constipation is better.   - Complains of nausea. Stopped famotidine & bisacodyl.  - senna 2 tab qhs- PRN  - prune juice        / Bladder  -continent  - PVR-low-    Skin / Pressure injury  - Monitor Incisions:    - Turn q2 hours in bed while awake, air mattress  - nursing to monitor skin qShift  - soft heel protectors  - skin barrier cream PRN    Diet/Dysphagia:  - Diet Consistency: Regular  - Aspiration Precautions  - SLP consult for swallow function evaluation and treatment  - Nutrition consult    DVT prophylaxis:   - lovenox--  - SCDs    IDT 7/13--  SW: resides with mother, and brother in [facility] 5h/d x 5d/week  OT: Sup. e/ UBD & grooming; Min A LBD;Mod  A transfers. Mod A with toilet. Max A shower; Goal Min & supervision I e/d and Min A transf  PT: Min A transf; Mod/Max A Amb. 30ft HC. & KAFO--needs assistance to advance leg. Mod A/Max A 2 steps.  Goal Mod I transf and ambulation 150ft with Sup.   Speech: Reg/thins diet; mild high level cognitive deficits, Mild Dysarthria.  Good awareness and safety  ELOS: 7/28 home    Outpatient Follow-up:  Nely Conroy)  [facility] Neurosurgery  General  [address] [place] [address], [address]  [place] [address]  Phone: [phone]  Fax: [phone]  Follow Up Time: Routine  ---------------

## 2021-07-14 PROCEDURE — 99232 SBSQ HOSP IP/OBS MODERATE 35: CPT

## 2021-07-14 RX ADMIN — ENOXAPARIN SODIUM 40 MILLIGRAM(S): 100 INJECTION SUBCUTANEOUS at 21:23

## 2021-07-14 RX ADMIN — TIZANIDINE 2 MILLIGRAM(S): 4 TABLET ORAL at 21:23

## 2021-07-14 RX ADMIN — Medication 10 MILLIGRAM(S): at 21:23
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
## 2021-07-14 NOTE — PROGRESS NOTE ADULT - ASSESSMENT
TURNER HERNANDEZ is a 30M with PMH of TBI s/p left frontoparietal craniectomy (3/2020) presented to [facility] on 6/18/21 for scheduled left cranioplasty, with right Hemiparesis, Spasticity, dysmetria, gait and aDL impairment.       L cranioplasty  - hx TBI 3/2020 s/p L craniectomy, s/p PEG (removed), s/p trach (decannulated)-incisions healing well.  - Cont.  comprehensive rehab program of PT/OT/SLP - 3 hours a day, 5 days a week. P&O as needed   --K-taping for right shoulder ordered in OT    Spasticity  --cont. Tizanidine 2mg qhs. Patient is tolerating it well. Seems to be helping during PT.    Pain  - Tylenol PRN  - Avoid sedating medications that may interfere with cognitive recovery    GI / Bowel  - States the constipation is better.   - Complains of nausea. Stopped famotidine & bisacodyl.  - senna 2 tab qhs- PRN  - prune juice        / Bladder  -continent  - PVR-low-    Skin / Pressure injury  - Monitor Incisions:    - Turn q2 hours in bed while awake, air mattress  - nursing to monitor skin qShift  - soft heel protectors  - skin barrier cream PRN    Diet/Dysphagia:  - Diet Consistency: Regular  - Aspiration Precautions  - SLP consult for swallow function evaluation and treatment  - Nutrition consult    DVT prophylaxis:   - lovenox--  - SCDs    IDT 7/13--  SW: resides with mother, and brother in [facility] 5h/d x 5d/week  OT: Sup. e/ UBD & grooming; Min A LBD;Mod  A transfers. Mod A with toilet. Max A shower; Goal Min & supervision I e/d and Min A transf  PT: Min A transf; Mod/Max A Amb. 30ft HC. & KAFO--needs assistance to advance leg. Mod A/Max A 2 steps.  Goal Mod I transf and ambulation 150ft with Sup.   Speech: Reg/thins diet; mild high level cognitive deficits, Mild Dysarthria.  Good awareness and safety  ELOS: 7/28 home    Outpatient Follow-up:  Nely Conroy)  [facility] Neurosurgery  General  [address] [place] [address], [address]  [place] [address]  Phone: [phone]  Fax: [phone]  Follow Up Time: Routine  ---------------

## 2021-07-15 ENCOUNTER — TRANSCRIPTION ENCOUNTER (OUTPATIENT)
Age: 31
End: 2021-07-15

## 2021-07-15 LAB
ALBUMIN SERPL ELPH-MCNC: 3.7 G/DL — SIGNIFICANT CHANGE UP (ref 3.3–5)
ALP SERPL-CCNC: 78 U/L — SIGNIFICANT CHANGE UP (ref 40–120)
ALT FLD-CCNC: 34 U/L — SIGNIFICANT CHANGE UP (ref 10–45)
ANION GAP SERPL CALC-SCNC: 7 MMOL/L — SIGNIFICANT CHANGE UP (ref 5–17)
AST SERPL-CCNC: 16 U/L — SIGNIFICANT CHANGE UP (ref 10–40)
BASOPHILS # BLD AUTO: 0.03 K/UL — SIGNIFICANT CHANGE UP (ref 0–0.2)
BASOPHILS NFR BLD AUTO: 0.6 % — SIGNIFICANT CHANGE UP (ref 0–2)
BILIRUB SERPL-MCNC: 0.5 MG/DL — SIGNIFICANT CHANGE UP (ref 0.2–1.2)
BUN SERPL-MCNC: 12 MG/DL — SIGNIFICANT CHANGE UP (ref 7–23)
CALCIUM SERPL-MCNC: 8.9 MG/DL — SIGNIFICANT CHANGE UP (ref 8.4–10.5)
CHLORIDE SERPL-SCNC: 102 MMOL/L — SIGNIFICANT CHANGE UP (ref 96–108)
CO2 SERPL-SCNC: 30 MMOL/L — SIGNIFICANT CHANGE UP (ref 22–31)
CREAT SERPL-MCNC: 1.01 MG/DL — SIGNIFICANT CHANGE UP (ref 0.5–1.3)
EOSINOPHIL # BLD AUTO: 0.15 K/UL — SIGNIFICANT CHANGE UP (ref 0–0.5)
EOSINOPHIL NFR BLD AUTO: 3.2 % — SIGNIFICANT CHANGE UP (ref 0–6)
GLUCOSE SERPL-MCNC: 91 MG/DL — SIGNIFICANT CHANGE UP (ref 70–99)
HCT VFR BLD CALC: 40.3 % — SIGNIFICANT CHANGE UP (ref 39–50)
HGB BLD-MCNC: 14.1 G/DL — SIGNIFICANT CHANGE UP (ref 13–17)
IMM GRANULOCYTES NFR BLD AUTO: 0.2 % — SIGNIFICANT CHANGE UP (ref 0–1.5)
LYMPHOCYTES # BLD AUTO: 1.98 K/UL — SIGNIFICANT CHANGE UP (ref 1–3.3)
LYMPHOCYTES # BLD AUTO: 41.6 % — SIGNIFICANT CHANGE UP (ref 13–44)
MCHC RBC-ENTMCNC: 33.3 PG — SIGNIFICANT CHANGE UP (ref 27–34)
MCHC RBC-ENTMCNC: 35 GM/DL — SIGNIFICANT CHANGE UP (ref 32–36)
MCV RBC AUTO: 95.3 FL — SIGNIFICANT CHANGE UP (ref 80–100)
MONOCYTES # BLD AUTO: 0.38 K/UL — SIGNIFICANT CHANGE UP (ref 0–0.9)
MONOCYTES NFR BLD AUTO: 8 % — SIGNIFICANT CHANGE UP (ref 2–14)
NEUTROPHILS # BLD AUTO: 2.21 K/UL — SIGNIFICANT CHANGE UP (ref 1.8–7.4)
NEUTROPHILS NFR BLD AUTO: 46.4 % — SIGNIFICANT CHANGE UP (ref 43–77)
NRBC # BLD: 0 /100 WBCS — SIGNIFICANT CHANGE UP (ref 0–0)
PLATELET # BLD AUTO: 209 K/UL — SIGNIFICANT CHANGE UP (ref 150–400)
POTASSIUM SERPL-MCNC: 3.9 MMOL/L — SIGNIFICANT CHANGE UP (ref 3.5–5.3)
POTASSIUM SERPL-SCNC: 3.9 MMOL/L — SIGNIFICANT CHANGE UP (ref 3.5–5.3)
PROT SERPL-MCNC: 7.6 G/DL — SIGNIFICANT CHANGE UP (ref 6–8.3)
RBC # BLD: 4.23 M/UL — SIGNIFICANT CHANGE UP (ref 4.2–5.8)
RBC # FLD: 11.3 % — SIGNIFICANT CHANGE UP (ref 10.3–14.5)
SODIUM SERPL-SCNC: 139 MMOL/L — SIGNIFICANT CHANGE UP (ref 135–145)
WBC # BLD: 4.76 K/UL — SIGNIFICANT CHANGE UP (ref 3.8–10.5)
WBC # FLD AUTO: 4.76 K/UL — SIGNIFICANT CHANGE UP (ref 3.8–10.5)

## 2021-07-15 PROCEDURE — 99232 SBSQ HOSP IP/OBS MODERATE 35: CPT

## 2021-07-15 RX ADMIN — TIZANIDINE 2 MILLIGRAM(S): 4 TABLET ORAL at 22:10

## 2021-07-15 RX ADMIN — ENOXAPARIN SODIUM 40 MILLIGRAM(S): 100 INJECTION SUBCUTANEOUS at 22:10

## 2021-07-15 RX ADMIN — Medication 10 MILLIGRAM(S): at 22:56

## 2021-07-15 NOTE — DISCHARGE NOTE PROVIDER - PROVIDER TOKENS
PROVIDER:[TOKEN:[[id number]],FOLLOWUP:[2 weeks]],PROVIDER:[TOKEN:[[id number]],FOLLOWUP:[1 month]],FREE:[LAST:[Primary],FIRST:[Care],PHONE:[(   )    -],FAX:[(   )    -]]
Normal for race

## 2021-07-15 NOTE — PROGRESS NOTE ADULT - ASSESSMENT
TURNER HERNANDEZ is a 30M with PMH of TBI s/p left frontoparietal craniectomy (3/2020) presented to [facility] on 6/18/21 for scheduled left cranioplasty, with right Hemiparesis, Spasticity, dysmetria, gait and aDL impairment.       L cranioplasty  - hx TBI 3/2020 s/p L craniectomy, s/p PEG (removed), s/p trach (decannulated)-incisions healing well.  - Cont.  comprehensive rehab program of PT/OT/SLP - 3 hours a day, 5 days a week. P&O as needed   --K-taping for right shoulder ordered in OT    Spasticity  --cont. Tizanidine 2mg qhs. Patient is tolerating it well. Seems to be helping during PT.    Pain  - Tylenol PRN  - Avoid sedating medications that may interfere with cognitive recovery    GI / Bowel  - Needed Doculax one time dose last time.   - Stopped famotidine & bisacodyl.  - senna 2 tab qhs- PRN  - prune juice        / Bladder  -continent  - PVR-low-    Skin / Pressure injury  - Monitor Incisions:    - Turn q2 hours in bed while awake, air mattress  - nursing to monitor skin qShift  - soft heel protectors  - skin barrier cream PRN    Diet/Dysphagia:  - Diet Consistency: Regular  - Aspiration Precautions  - SLP consult for swallow function evaluation and treatment  - Nutrition consult    DVT prophylaxis:   - lovenox--  - SCDs    IDT 7/13--  SW: resides with mother, and brother in [facility] 5h/d x 5d/week  OT: Sup. e/ UBD & grooming; Min A LBD;Mod  A transfers. Mod A with toilet. Max A shower; Goal Min & supervision I e/d and Min A transf  PT: Min A transf; Mod/Max A Amb. 30ft HC. & KAFO--needs assistance to advance leg. Mod A/Max A 2 steps.  Goal Mod I transf and ambulation 150ft with Sup.   Speech: Reg/thins diet; mild high level cognitive deficits, Mild Dysarthria.  Good awareness and safety  ELOS: 7/28 home    Outpatient Follow-up:  Nely Conroy)  [facility] Neurosurgery  General  [address] [place] [address], [address]  [place] [address]  Phone: [phone]  Fax: [phone]  Follow Up Time: Routine  ---------------
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
## 2021-07-15 NOTE — DISCHARGE NOTE PROVIDER - NSDCMRMEDTOKEN_GEN_ALL_CORE_FT
Ducodyl 5 mg oral delayed release tablet: 1 tab(s) orally once a day, As Needed - for constipation  levETIRAcetam 500 mg oral tablet: 1 tab(s) orally every 12 hours  oxyCODONE 5 mg oral tablet: 1 tab(s) orally every 6 hours, As needed, Moderate Pain (4 10 MDD:4 tabs  polyethylene glycol 3350 oral powder for reconstitution: 17 gram(s) orally once a day  Tylenol 325 mg oral tablet: 2 tab(s) orally , As Needed, prn   3-1 commode, Transfer tub bench, X2 18&quot; grab bars: Traumatic brain injury  Ducodyl 5 mg oral delayed release tablet: 1 tab(s) orally once a day, As Needed - for constipation  levETIRAcetam 500 mg oral tablet: 1 tab(s) orally every 12 hours  Occupational therapy: Occupational therapy BIW x 12 weeks  Eval and treat, right UE stretching, strengthening, splinting PRN, ADLs, IADLs, Modalities: NMES, shoulder taping PRN.  HEP  oxyCODONE 5 mg oral tablet: 1 tab(s) orally every 6 hours, As needed, Moderate Pain (4 10 MDD:4 tabs  Physical Therapy: Physical Therapy BIW x 12 weeks  Eval and treat, balance, stretching, strengthening, ambulation, stairs, coordination,  HEP.  Modalities PRN.    polyethylene glycol 3350 oral powder for reconstitution: 17 gram(s) orally once a day  Speech therapy: Speech therapy BIW x 8 weeks  eval and treat, Dysarthria, cogntiive linguistic skills. HEP  Tylenol 325 mg oral tablet: 2 tab(s) orally , As Needed, prn   3-1 commode, Transfer tub bench, X2 18&quot; grab bars: Traumatic brain injury  acetaminophen 325 mg oral tablet: 2 tab(s) orally every 6 hours, As needed, Mild Pain (1 - 3)  bisacodyl 5 mg oral delayed release tablet: 2 tab(s) orally once a day (at bedtime)  Occupational therapy: Occupational therapy BIW x 12 weeks  Eval and treat, right UE stretching, strengthening, splinting PRN, ADLs, IADLs, Modalities: NMES, shoulder taping PRN.  HEP  Physical Therapy: Physical Therapy BIW x 12 weeks  Eval and treat, balance, stretching, strengthening, ambulation, stairs, coordination,  HEP.  Modalities PRN.    polyethylene glycol 3350 oral powder for reconstitution: 17 gram(s) orally once a day  Speech therapy: Speech therapy BIW x 8 weeks  eval and treat, Dysarthria, cogntiive linguistic skills. HEP  tiZANidine 4 mg oral tablet: 1 tab(s) orally once a day (at bedtime)

## 2021-07-15 NOTE — DISCHARGE NOTE PROVIDER - EXTENDED VTE YES NO FOR MLM ENOXAPARIN
Instructions (Optional): Defer Restylane Sylk to perioral lines.
Introduction Text (Please End With A Colon): The following procedure was deferred:
,
Detail Level: Detailed
Procedure To Be Performed At Next Visit: Filler: Restylane

## 2021-07-15 NOTE — DISCHARGE NOTE PROVIDER - CARE PROVIDER_API CALL
Nely Conroy)  [facility]  General  [address], [address]  [place] [address]  Phone: [phone]  Fax: [phone]  Follow Up Time: 2 weeks    Magnolia Romero (DO)  Brain Injury Medicine; PhysicalRehab Medicine  [address] [place] [address]  Phone: [phone]  Fax: [phone]  Follow Up Time: 1 month    Primary, Care  Phone: (   )    -  Fax: (   )    -  Follow Up Time:
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
## 2021-07-16 PROCEDURE — 99232 SBSQ HOSP IP/OBS MODERATE 35: CPT

## 2021-07-16 RX ORDER — TIZANIDINE 4 MG/1
4 TABLET ORAL AT BEDTIME
Refills: 0 | Status: DISCONTINUED | OUTPATIENT
Start: 2021-07-16 | End: 2021-08-03

## 2021-07-16 RX ADMIN — ENOXAPARIN SODIUM 40 MILLIGRAM(S): 100 INJECTION SUBCUTANEOUS at 22:33

## 2021-07-16 RX ADMIN — TIZANIDINE 4 MILLIGRAM(S): 4 TABLET ORAL at 22:35

## 2021-07-16 RX ADMIN — Medication 10 MILLIGRAM(S): at 22:33
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
## 2021-07-16 NOTE — PROGRESS NOTE ADULT - ASSESSMENT
TURNER HERNANDEZ is a 30M with PMH of TBI s/p left frontoparietal craniectomy (3/2020) presented to [facility] on 6/18/21 for scheduled left cranioplasty, with right Hemiparesis, Spasticity, dysmetria, gait and aDL impairment.       L cranioplasty  - hx TBI 3/2020 s/p L craniectomy, s/p PEG (removed), s/p trach (decannulated)-incisions healing well.  - Cont.  comprehensive rehab program of PT/OT/SLP - 3 hours a day, 5 days a week. P&O as needed   --K-taping for right shoulder ordered in OT    Spasticity  --cont. Tizanidine 2mg qhs. Patient is tolerating it well. Seems to be helping during PT.    Pain  - Tylenol PRN  - Avoid sedating medications that may interfere with cognitive recovery    GI / Bowel  - Stopped famotidine & bisacodyl.  - senna 2 tab qhs- PRN  - prune juice        / Bladder  -continent  - PVR-low-    Skin / Pressure injury  - Monitor Incisions:    - Turn q2 hours in bed while awake, air mattress  - nursing to monitor skin qShift  - soft heel protectors  - skin barrier cream PRN    Diet/Dysphagia:  - Diet Consistency: Regular  - Aspiration Precautions  - SLP consult for swallow function evaluation and treatment  - Nutrition consult    DVT prophylaxis:   - lovenox--  - SCDs    IDT 7/13--  SW: resides with mother, and brother in [facility] 5h/d x 5d/week  OT: Sup. e/ UBD & grooming; Min A LBD;Mod  A transfers. Mod A with toilet. Max A shower; Goal Min & supervision I e/d and Min A transf  PT: Min A transf; Mod/Max A Amb. 30ft HC. & KAFO--needs assistance to advance leg. Mod A/Max A 2 steps.  Goal Mod I transf and ambulation 150ft with Sup.   Speech: Reg/thins diet; mild high level cognitive deficits, Mild Dysarthria.  Good awareness and safety  ELOS: 7/28 home    Outpatient Follow-up:  Nely Conroy)  [facility] Neurosurgery  General  [address] [place] [address], [address]  [place] [address]  Phone: [phone]  Fax: [phone]  Follow Up Time: Routine  --------------- TURNER HERNANDEZ is a 30M with PMH of TBI s/p left frontoparietal craniectomy (3/2020) presented to [facility] on 6/18/21 for scheduled left cranioplasty, with right Hemiparesis, Spasticity, dysmetria, gait and aDL impairment.       L cranioplasty  - hx TBI 3/2020 s/p L craniectomy, s/p PEG (removed), s/p trach (decannulated)-incisions healing well.  - Cont.  comprehensive rehab program of PT/OT/SLP - 3 hours a day, 5 days a week. P&O as needed   --K-taping for right shoulder ordered in OT    Spasticity  --Will increase tizanidine to 4mg qhs to help with the spasticity . Seems to be helping during PT.    Pain  - Tylenol PRN  - Avoid sedating medications that may interfere with cognitive recovery    GI / Bowel  - Stopped famotidine.  - senna 2 tab qhs & bisacodyl  - PRN  - prune juice        / Bladder  -continent  - PVR-low-    Skin / Pressure injury  - Monitor Incisions:    - Turn q2 hours in bed while awake, air mattress  - nursing to monitor skin qShift  - soft heel protectors  - skin barrier cream PRN    Diet/Dysphagia:  - Diet Consistency: Regular  - Aspiration Precautions  - SLP consult for swallow function evaluation and treatment  - Nutrition consult    DVT prophylaxis:   - lovenox--  - SCDs    IDT 7/13--  SW: resides with mother, and brother in [facility] 5h/d x 5d/week  OT: Sup. e/ UBD & grooming; Min A LBD;Mod  A transfers. Mod A with toilet. Max A shower; Goal Min & supervision I e/d and Min A transf  PT: Min A transf; Mod/Max A Amb. 30ft HC. & KAFO--needs assistance to advance leg. Mod A/Max A 2 steps.  Goal Mod I transf and ambulation 150ft with Sup.   Speech: Reg/thins diet; mild high level cognitive deficits, Mild Dysarthria.  Good awareness and safety  ELOS: 7/28 home    Outpatient Follow-up:  Nely Conroy)  [facility] Neurosurgery  General  [address] [place] [address], [address]  [place] [address]  Phone: [phone]  Fax: [phone]  Follow Up Time: Routine  --------------- TURNER HERNANDEZ is a 30M with PMH of TBI s/p left frontoparietal craniectomy (3/2020) presented to [facility] on 6/18/21 for scheduled left cranioplasty, with right Hemiparesis, Spasticity, dysmetria, gait and aDL impairment.       L cranioplasty  - hx TBI 3/2020 s/p L craniectomy, s/p PEG (removed), s/p trach (decannulated)-incisions healing well.  - Cont.  comprehensive rehab program of PT/OT/SLP - 3 hours a day, 5 days a week. P&O as needed   --K-taping for right shoulder ordered in OT    Spasticity  --Will increase tizanidine to 4mg qhs to help with the spasticity .--d/w Pt.     Pain  - Tylenol PRN  - Avoid sedating medications that may interfere with cognitive recovery    GI / Bowel  - Stopped famotidine.  - senna 2 tab qhs & bisacodyl  - PRN  - prune juice        / Bladder  -continent  - PVR-low-    Skin / Pressure injury  - Monitor Incisions:    - Turn q2 hours in bed while awake, air mattress  - nursing to monitor skin qShift  - soft heel protectors  - skin barrier cream PRN    Diet/Dysphagia:  - Diet Consistency: Regular  - Aspiration Precautions  - SLP consult for swallow function evaluation and treatment  - Nutrition consult    DVT prophylaxis:   - lovenox--  - SCDs    IDT 7/13--  SW: resides with mother, and brother in [facility] 5h/d x 5d/week  OT: Sup. e/ UBD & grooming; Min A LBD;Mod  A transfers. Mod A with toilet. Max A shower; Goal Min & supervision I e/d and Min A transf  PT: Min A transf; Mod/Max A Amb. 30ft HC. & KAFO--needs assistance to advance leg. Mod A/Max A 2 steps.  Goal Mod I transf and ambulation 150ft with Sup.   Speech: Reg/thins diet; mild high level cognitive deficits, Mild Dysarthria.  Good awareness and safety  ELOS: 7/28 home    Outpatient Follow-up:  Nely Conroy)  [facility] Neurosurgery  General  [address] [place] [address], [address]  [place] [address]  Phone: [phone]  Fax: [phone]  Follow Up Time: Routine  ---------------

## 2021-07-17 PROCEDURE — 99232 SBSQ HOSP IP/OBS MODERATE 35: CPT

## 2021-07-17 RX ADMIN — ENOXAPARIN SODIUM 40 MILLIGRAM(S): 100 INJECTION SUBCUTANEOUS at 21:35

## 2021-07-17 RX ADMIN — Medication 10 MILLIGRAM(S): at 21:37

## 2021-07-17 RX ADMIN — TIZANIDINE 4 MILLIGRAM(S): 4 TABLET ORAL at 21:35

## 2021-07-17 NOTE — PROGRESS NOTE ADULT - ASSESSMENT
31 y/o M PMH HTN, TBI s/p left frontoparietal craniectomy (3/2020) presented to [facility] on 6/18/21 for scheduled left frontoparietal cranioplasty, now with functional deficits admitted for inpatient rehab- pt/ot/dvt ppx    #L frontoparietal cranioplasty  -Keppra for sz ppx  -Continue comprehensive acute rehab     #HTN  -Normotensive off meds  -Monitor    #DVT ppx  -Lovenox

## 2021-07-17 NOTE — PROGRESS NOTE ADULT - SUBJECTIVE AND OBJECTIVE BOX
Patient is a 30y old  Male who presents with a chief complaint of s/p cranioplasty (17 Jul 2021 10:06)      SUBJECTIVE / OVERNIGHT EVENTS:  Pt seen and examined at bedside. No acute events overnight.  Pt denies cp, palpitations, sob, abd pain, N/V, fever, chills.    ROS:  All other review of systems negative    Allergies    No Known Allergies    Intolerances        MEDICATIONS  (STANDING):  enoxaparin Injectable 40 milliGRAM(s) SubCutaneous at bedtime  tiZANidine 4 milliGRAM(s) Oral at bedtime    MEDICATIONS  (PRN):  acetaminophen   Tablet .. 650 milliGRAM(s) Oral every 6 hours PRN Mild Pain (1 - 3)  bisacodyl 10 milliGRAM(s) Oral daily PRN Constipation  senna 2 Tablet(s) Oral at bedtime PRN Constipation      Vital Signs Last 24 Hrs  T(C): 36.6 (17 Jul 2021 08:15), Max: 36.6 (16 Jul 2021 20:06)  T(F): 97.8 (17 Jul 2021 08:15), Max: 97.9 (16 Jul 2021 20:06)  HR: 63 (17 Jul 2021 08:15) (63 - 66)  BP: 121/74 (17 Jul 2021 08:15) (108/72 - 121/74)  BP(mean): --  RR: 15 (17 Jul 2021 08:15) (14 - 15)  SpO2: 98% (17 Jul 2021 08:15) (98% - 98%)  CAPILLARY BLOOD GLUCOSE        I&O's Summary    16 Jul 2021 07:01  -  17 Jul 2021 07:00  --------------------------------------------------------  IN: 480 mL / OUT: 1600 mL / NET: -1120 mL        PHYSICAL EXAM:  GENERAL: NAD, well-developed  HEAD:  Atraumatic, Normocephalic  EYES: EOMI, PERRLA, conjunctiva and sclera clear  NECK: Supple, No JVD  CHEST/LUNG: Clear to auscultation bilaterally; No wheeze, nonlabored breathing  HEART: Regular rate and rhythm; No murmurs, rubs, or gallops  ABDOMEN: Soft, Nontender, Nondistended; Bowel sounds present  EXTREMITIES:  2+ Peripheral Pulses, No clubbing, cyanosis, or edema  NEUROLOGY: AAOx3, right sided weakness  PSYCH: calm, appropriate mood    LABS:                    RADIOLOGY & ADDITIONAL TESTS:  Results Reviewed:   Imaging Personally Reviewed:  Electrocardiogram Personally Reviewed:    COORDINATION OF CARE:  Care Discussed with Consultants/Other Providers [Y/N]:  Prior or Outpatient Records Reviewed [Y/N]:
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
## 2021-07-17 NOTE — PROGRESS NOTE ADULT - ASSESSMENT
TURNER HERNANDEZ is a 30M with PMH of TBI s/p left frontoparietal craniectomy (3/2020) presented to [facility] on 6/18/21 for scheduled left cranioplasty, with right Hemiparesis, Spasticity, dysmetria, gait and aDL impairment.       L cranioplasty  - hx TBI 3/2020 s/p L craniectomy, s/p PEG (removed), s/p trach (decannulated)-incisions healing well.  - Cont.  comprehensive rehab program of PT/OT/SLP - 3 hours a day, 5 days a week. P&O as needed   --K-taping for right shoulder ordered in OT    Spasticity  - tizanidine 4mg qhs     Pain  - Tylenol PRN  - Avoid sedating medications that may interfere with cognitive recovery    GI / Bowel  - Stopped famotidine.  - senna 2 tab qhs & bisacodyl  - PRN  - prune juice        / Bladder  -continent  - PVR-low-    Skin / Pressure injury  - Monitor Incisions:    - Turn q2 hours in bed while awake, air mattress  - nursing to monitor skin qShift  - soft heel protectors  - skin barrier cream PRN    Diet/Dysphagia:  - Diet Consistency: Regular  - Aspiration Precautions  - SLP consult for swallow function evaluation and treatment  - Nutrition consult    DVT prophylaxis:   - lovenox--  - SCDs    Labs:  CBC, CMP 7/19        Outpatient Follow-up:  Nely Conroy)  [facility] Neurosurgery  General  [address] [place] [address], [address]  [place] [address]  Phone: [phone]  Fax: [phone]  Follow Up Time: Routine  ---------------

## 2021-07-18 PROCEDURE — 99232 SBSQ HOSP IP/OBS MODERATE 35: CPT

## 2021-07-18 RX ADMIN — TIZANIDINE 4 MILLIGRAM(S): 4 TABLET ORAL at 21:50

## 2021-07-18 RX ADMIN — ENOXAPARIN SODIUM 40 MILLIGRAM(S): 100 INJECTION SUBCUTANEOUS at 21:50

## 2021-07-18 RX ADMIN — Medication 10 MILLIGRAM(S): at 22:21
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
## 2021-07-18 NOTE — PROGRESS NOTE ADULT - ASSESSMENT
TURNER HERNANDEZ is a 30M with PMH of TBI s/p left frontoparietal craniectomy (3/2020) presented to [facility] on 6/18/21 for scheduled left cranioplasty, with right Hemiparesis, Spasticity, dysmetria, gait and aDL impairment.       L cranioplasty  - hx TBI 3/2020 s/p L craniectomy, s/p PEG (removed), s/p trach (decannulated)-incisions healing well.  - Cont.  comprehensive rehab program of PT/OT/SLP - 3 hours a day, 5 days a week. P&O as needed   --K-taping for right shoulder ordered in OT    Spasticity  - tizanidine 4mg qhs     Pain  - Tylenol PRN  - Avoid sedating medications that may interfere with cognitive recovery    GI / Bowel  - Stopped famotidine.  - senna 2 tab qhs & bisacodyl  - PRN  - prune juice        / Bladder  -continent  - PVR-low-    Skin / Pressure injury  - Monitor Incisions:    - Turn q2 hours in bed while awake, air mattress  - nursing to monitor skin qShift  - soft heel protectors  - skin barrier cream PRN    Diet/Dysphagia:  - Diet Consistency: Regular  - Aspiration Precautions  - SLP consult for swallow function evaluation and treatment  - Nutrition consult    DVT prophylaxis:   - lovenox--  - SCDs    Labs:  CBC, CMP 7/19        Outpatient Follow-up:  Nely Conroy)  [facility] Neurosurgery  General  [address] [place] [address], [address]  [place] [address]  Phone: [phone]  Fax: [phone]  Follow Up Time: Routine  ---------------

## 2021-07-18 NOTE — PROGRESS NOTE ADULT - ASSESSMENT
29 y/o M PMH HTN, TBI s/p left frontoparietal craniectomy (3/2020) presented to [facility] on 6/18/21 for scheduled left frontoparietal cranioplasty, now with functional deficits admitted for inpatient rehab- pt/ot/dvt ppx    #L frontoparietal cranioplasty  -Keppra for sz ppx  -Continue comprehensive acute rehab     #HTN  -Normotensive off meds  -Monitor    #DVT ppx  -Lovenox

## 2021-07-18 NOTE — PROGRESS NOTE ADULT - SUBJECTIVE AND OBJECTIVE BOX
Patient is a 30y old  Male who presents with a chief complaint of s/p cranioplasty (18 Jul 2021 07:34)      SUBJECTIVE / OVERNIGHT EVENTS:  Pt seen and examined at bedside. No acute events overnight.  Pt denies cp, palpitations, sob, abd pain, N/V, fever, chills.    ROS:  All other review of systems negative    Allergies    No Known Allergies    Intolerances        MEDICATIONS  (STANDING):  enoxaparin Injectable 40 milliGRAM(s) SubCutaneous at bedtime  tiZANidine 4 milliGRAM(s) Oral at bedtime    MEDICATIONS  (PRN):  acetaminophen   Tablet .. 650 milliGRAM(s) Oral every 6 hours PRN Mild Pain (1 - 3)  bisacodyl 10 milliGRAM(s) Oral daily PRN Constipation  senna 2 Tablet(s) Oral at bedtime PRN Constipation      Vital Signs Last 24 Hrs  T(C): 36.7 (18 Jul 2021 08:36), Max: 36.7 (17 Jul 2021 19:57)  T(F): 98 (18 Jul 2021 08:36), Max: 98 (17 Jul 2021 19:57)  HR: 63 (18 Jul 2021 08:36) (63 - 85)  BP: 119/83 (18 Jul 2021 08:36) (119/83 - 133/81)  BP(mean): --  RR: 15 (18 Jul 2021 08:36) (15 - 15)  SpO2: 100% (18 Jul 2021 08:36) (95% - 100%)  CAPILLARY BLOOD GLUCOSE        I&O's Summary    17 Jul 2021 07:01  -  18 Jul 2021 07:00  --------------------------------------------------------  IN: 0 mL / OUT: 350 mL / NET: -350 mL        PHYSICAL EXAM:  GENERAL: NAD, well-developed  HEAD:  Atraumatic, Normocephalic  EYES: EOMI, PERRLA, conjunctiva and sclera clear  NECK: Supple, No JVD  CHEST/LUNG: Clear to auscultation bilaterally; No wheeze, nonlabored breathing  HEART: Regular rate and rhythm; No murmurs, rubs, or gallops  ABDOMEN: Soft, Nontender, Nondistended; Bowel sounds present  EXTREMITIES:  2+ Peripheral Pulses, No clubbing, cyanosis, or edema  NEUROLOGY: AAOx3, right sided weakness  PSYCH: calm, appropriate mood    LABS:                    RADIOLOGY & ADDITIONAL TESTS:  Results Reviewed:   Imaging Personally Reviewed:  Electrocardiogram Personally Reviewed:    COORDINATION OF CARE:  Care Discussed with Consultants/Other Providers [Y/N]:  Prior or Outpatient Records Reviewed [Y/N]:

## 2021-07-19 LAB
ALBUMIN SERPL ELPH-MCNC: 3.7 G/DL — SIGNIFICANT CHANGE UP (ref 3.3–5)
ALP SERPL-CCNC: 82 U/L — SIGNIFICANT CHANGE UP (ref 40–120)
ALT FLD-CCNC: 46 U/L — HIGH (ref 10–45)
ANION GAP SERPL CALC-SCNC: 8 MMOL/L — SIGNIFICANT CHANGE UP (ref 5–17)
AST SERPL-CCNC: 24 U/L — SIGNIFICANT CHANGE UP (ref 10–40)
BASOPHILS # BLD AUTO: 0.04 K/UL — SIGNIFICANT CHANGE UP (ref 0–0.2)
BASOPHILS NFR BLD AUTO: 0.7 % — SIGNIFICANT CHANGE UP (ref 0–2)
BILIRUB SERPL-MCNC: 0.5 MG/DL — SIGNIFICANT CHANGE UP (ref 0.2–1.2)
BUN SERPL-MCNC: 11 MG/DL — SIGNIFICANT CHANGE UP (ref 7–23)
CALCIUM SERPL-MCNC: 9 MG/DL — SIGNIFICANT CHANGE UP (ref 8.4–10.5)
CHLORIDE SERPL-SCNC: 103 MMOL/L — SIGNIFICANT CHANGE UP (ref 96–108)
CO2 SERPL-SCNC: 28 MMOL/L — SIGNIFICANT CHANGE UP (ref 22–31)
CREAT SERPL-MCNC: 1.11 MG/DL — SIGNIFICANT CHANGE UP (ref 0.5–1.3)
EOSINOPHIL # BLD AUTO: 0.14 K/UL — SIGNIFICANT CHANGE UP (ref 0–0.5)
EOSINOPHIL NFR BLD AUTO: 2.6 % — SIGNIFICANT CHANGE UP (ref 0–6)
GLUCOSE SERPL-MCNC: 91 MG/DL — SIGNIFICANT CHANGE UP (ref 70–99)
HCT VFR BLD CALC: 39 % — SIGNIFICANT CHANGE UP (ref 39–50)
HGB BLD-MCNC: 13.9 G/DL — SIGNIFICANT CHANGE UP (ref 13–17)
IMM GRANULOCYTES NFR BLD AUTO: 0.4 % — SIGNIFICANT CHANGE UP (ref 0–1.5)
LYMPHOCYTES # BLD AUTO: 2.35 K/UL — SIGNIFICANT CHANGE UP (ref 1–3.3)
LYMPHOCYTES # BLD AUTO: 43.4 % — SIGNIFICANT CHANGE UP (ref 13–44)
MCHC RBC-ENTMCNC: 33.4 PG — SIGNIFICANT CHANGE UP (ref 27–34)
MCHC RBC-ENTMCNC: 35.6 GM/DL — SIGNIFICANT CHANGE UP (ref 32–36)
MCV RBC AUTO: 93.8 FL — SIGNIFICANT CHANGE UP (ref 80–100)
MONOCYTES # BLD AUTO: 0.41 K/UL — SIGNIFICANT CHANGE UP (ref 0–0.9)
MONOCYTES NFR BLD AUTO: 7.6 % — SIGNIFICANT CHANGE UP (ref 2–14)
NEUTROPHILS # BLD AUTO: 2.45 K/UL — SIGNIFICANT CHANGE UP (ref 1.8–7.4)
NEUTROPHILS NFR BLD AUTO: 45.3 % — SIGNIFICANT CHANGE UP (ref 43–77)
NRBC # BLD: 0 /100 WBCS — SIGNIFICANT CHANGE UP (ref 0–0)
PLATELET # BLD AUTO: 210 K/UL — SIGNIFICANT CHANGE UP (ref 150–400)
POTASSIUM SERPL-MCNC: 4.5 MMOL/L — SIGNIFICANT CHANGE UP (ref 3.5–5.3)
POTASSIUM SERPL-SCNC: 4.5 MMOL/L — SIGNIFICANT CHANGE UP (ref 3.5–5.3)
PROT SERPL-MCNC: 7.6 G/DL — SIGNIFICANT CHANGE UP (ref 6–8.3)
RBC # BLD: 4.16 M/UL — LOW (ref 4.2–5.8)
RBC # FLD: 10.8 % — SIGNIFICANT CHANGE UP (ref 10.3–14.5)
SODIUM SERPL-SCNC: 139 MMOL/L — SIGNIFICANT CHANGE UP (ref 135–145)
WBC # BLD: 5.41 K/UL — SIGNIFICANT CHANGE UP (ref 3.8–10.5)
WBC # FLD AUTO: 5.41 K/UL — SIGNIFICANT CHANGE UP (ref 3.8–10.5)

## 2021-07-19 PROCEDURE — 99232 SBSQ HOSP IP/OBS MODERATE 35: CPT

## 2021-07-19 RX ADMIN — ENOXAPARIN SODIUM 40 MILLIGRAM(S): 100 INJECTION SUBCUTANEOUS at 22:57

## 2021-07-19 RX ADMIN — TIZANIDINE 4 MILLIGRAM(S): 4 TABLET ORAL at 22:57

## 2021-07-19 RX ADMIN — Medication 10 MILLIGRAM(S): at 20:37

## 2021-07-19 NOTE — PROGRESS NOTE ADULT - TIME BILLING
Seen during family training with sister.  Sister feels comfortable so far with how pt. is doing.  She states they will be getting a new wheelchair from the company as old wheelchair is broken.  All questions answered.
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
## 2021-07-19 NOTE — PROGRESS NOTE ADULT - SUBJECTIVE AND OBJECTIVE BOX
Patient is a 30y old  Male who presents with a chief complaint of s/p cranioplasty (18 Jul 2021 10:59)      SUBJECTIVE / OVERNIGHT EVENTS:  Pt seen and examined at bedside. No acute events overnight.  Pt denies cp, palpitations, sob, abd pain, N/V, fever, chills.    ROS:  All other review of systems negative    Allergies    No Known Allergies    Intolerances        MEDICATIONS  (STANDING):  enoxaparin Injectable 40 milliGRAM(s) SubCutaneous at bedtime  tiZANidine 4 milliGRAM(s) Oral at bedtime    MEDICATIONS  (PRN):  acetaminophen   Tablet .. 650 milliGRAM(s) Oral every 6 hours PRN Mild Pain (1 - 3)  bisacodyl 10 milliGRAM(s) Oral daily PRN Constipation  senna 2 Tablet(s) Oral at bedtime PRN Constipation      Vital Signs Last 24 Hrs  T(C): 36.3 (19 Jul 2021 07:34), Max: 36.6 (18 Jul 2021 19:35)  T(F): 97.4 (19 Jul 2021 07:34), Max: 97.8 (18 Jul 2021 19:35)  HR: 68 (19 Jul 2021 11:35) (60 - 72)  BP: 123/82 (19 Jul 2021 11:35) (117/66 - 123/82)  BP(mean): --  RR: 15 (19 Jul 2021 11:35) (15 - 15)  SpO2: 99% (19 Jul 2021 11:35) (97% - 100%)  CAPILLARY BLOOD GLUCOSE        I&O's Summary    18 Jul 2021 07:01  -  19 Jul 2021 07:00  --------------------------------------------------------  IN: 0 mL / OUT: 600 mL / NET: -600 mL    19 Jul 2021 07:01  -  19 Jul 2021 11:43  --------------------------------------------------------  IN: 0 mL / OUT: 300 mL / NET: -300 mL        PHYSICAL EXAM:  GENERAL: NAD, well-developed  HEAD:  Atraumatic, Normocephalic  EYES: EOMI, PERRLA, conjunctiva and sclera clear  NECK: Supple, No JVD  CHEST/LUNG: Clear to auscultation bilaterally; No wheeze, nonlabored breathing  HEART: Regular rate and rhythm; No murmurs, rubs, or gallops  ABDOMEN: Soft, Nontender, Nondistended; Bowel sounds present  EXTREMITIES:  2+ Peripheral Pulses, No clubbing, cyanosis, or edema  NEUROLOGY: AAOx3, right sided weakness  PSYCH: calm, appropriate mood      LABS:                        13.9   5.41  )-----------( 210      ( 19 Jul 2021 06:30 )             39.0     07-19    139  |  103  |  11  ----------------------------<  91  4.5   |  28  |  1.11    Ca    9.0      19 Jul 2021 06:30    TPro  7.6  /  Alb  3.7  /  TBili  0.5  /  DBili  x   /  AST  24  /  ALT  46<H>  /  AlkPhos  82  07-19              RADIOLOGY & ADDITIONAL TESTS:  Results Reviewed:   Imaging Personally Reviewed:  Electrocardiogram Personally Reviewed:    COORDINATION OF CARE:  Care Discussed with Consultants/Other Providers [Y/N]:  Prior or Outpatient Records Reviewed [Y/N]:

## 2021-07-19 NOTE — PROGRESS NOTE ADULT - ASSESSMENT
TURNER HERNANDEZ is a 30M with PMH of TBI s/p left frontoparietal craniectomy (3/2020) presented to [facility] on 6/18/21 for scheduled left cranioplasty, with right Hemiparesis, Spasticity, dysmetria, gait and aDL impairment.       L cranioplasty  - hx TBI 3/2020 s/p L craniectomy, s/p PEG (removed), s/p trach (decannulated)-incisions healing well.  - Cont.  comprehensive rehab program of PT/OT/SLP - 3 hours a day, 5 days a week. P&O as needed   --K-taping for right shoulder ordered in OT    Spasticity  - tizanidine 4mg qhs. Tolerating it well     Pain  - Tylenol PRN  - Avoid sedating medications that may interfere with cognitive recovery    GI / Bowel  - Stopped famotidine.  - senna 2 tab qhs & bisacodyl  - PRN  - prune juice        / Bladder  -continent  - PVR-low-    Skin / Pressure injury  - Monitor Incisions:    - Turn q2 hours in bed while awake, air mattress  - nursing to monitor skin qShift  - soft heel protectors  - skin barrier cream PRN    Diet/Dysphagia:  - Diet Consistency: Regular  - Aspiration Precautions  - SLP consult for swallow function evaluation and treatment  - Nutrition consult    DVT prophylaxis:   - lovenox--  - SCDs    Labs:  CBC, CMP 7/19        Outpatient Follow-up:  Nely Conroy)  [facility] Neurosurgery  General  [address], [address]  [place] [address]  Phone: [phone]  Fax: [phone]  Follow Up Time: Routine  --------------- TURNER HERNANDEZ is a 30M with PMH of TBI s/p left frontoparietal craniectomy (3/2020) presented to [facility] on 6/18/21 for scheduled left cranioplasty, with right Hemiparesis, Spasticity, dysmetria, gait and aDL impairment.       L cranioplasty  - hx TBI 3/2020 s/p L craniectomy, s/p PEG (removed), s/p trach (decannulated)-incisions healing well.  - Cont.  comprehensive rehab program of PT/OT/SLP - 3 hours a day, 5 days a week. P&O as needed   --K-taping for right shoulder ordered in OT    Spasticity  - tizanidine 4mg qhs. Tolerating it well     Pain  - Tylenol PRN  - Avoid sedating medications that may interfere with cognitive recovery    GI / Bowel  - Stopped famotidine.  - senna 2 tab qhs & bisacodyl  - PRN  - prune juice        / Bladder  -continent  - PVR-low-    Skin / Pressure injury  - Monitor Incisions:    - Turn q2 hours in bed while awake, air mattress  - nursing to monitor skin qShift  - soft heel protectors  - skin barrier cream PRN    Diet/Dysphagia:  - Diet Consistency: Regular  - Aspiration Precautions  - SLP consult for swallow function evaluation and treatment  - Nutrition consult    DVT prophylaxis:   - lovenox--  - SCDs    Labs:  CBC, CMP 7/19        Outpatient Follow-up:  Nely Conroy)  [facility] Neurosurgery  General  [address] [place] [address], [address]  [place] [address]  Phone: [phone]  Fax: [phone]  Follow Up Time: Routine  ---------------    IDT 7/13--  SW: resides with mother, and brother in [facility] 5h/d x 5d/week  OT: Sup. e/ UBD & grooming; Min A LBD;Mod  A transfers. Mod A with toilet. Max A shower; Goal Min & supervision I e/d and Min A transf  PT: Min A transf; Mod/Max A Amb. 30ft HC. & KAFO--needs assistance to advance leg. Mod A/Max A 2 steps.  Goal Mod I transf and ambulation 150ft with Sup.   Speech: Reg/thins diet; mild high level cognitive deficits, Mild Dysarthria.  Good awareness and safety  ELOS: 7/28 home

## 2021-07-19 NOTE — PROGRESS NOTE ADULT - ASSESSMENT
31 y/o M PMH HTN, TBI s/p left frontoparietal craniectomy (3/2020) presented to [facility] on 6/18/21 for scheduled left frontoparietal cranioplasty, now with functional deficits admitted for inpatient rehab- pt/ot/dvt ppx    #L frontoparietal cranioplasty  -Continue comprehensive acute rehab   -Fall, seizure precautions  -Tizanidine qhs    #HTN  -Normotensive off meds  -Monitor    #DVT ppx  -Lovenox

## 2021-07-20 PROCEDURE — 99232 SBSQ HOSP IP/OBS MODERATE 35: CPT

## 2021-07-20 RX ORDER — POLYETHYLENE GLYCOL 3350 17 G/17G
17 POWDER, FOR SOLUTION ORAL DAILY
Refills: 0 | Status: DISCONTINUED | OUTPATIENT
Start: 2021-07-20 | End: 2021-08-03

## 2021-07-20 RX ADMIN — SENNA PLUS 2 TABLET(S): 8.6 TABLET ORAL at 21:22

## 2021-07-20 RX ADMIN — TIZANIDINE 4 MILLIGRAM(S): 4 TABLET ORAL at 21:23

## 2021-07-20 RX ADMIN — ENOXAPARIN SODIUM 40 MILLIGRAM(S): 100 INJECTION SUBCUTANEOUS at 21:22

## 2021-07-20 RX ADMIN — POLYETHYLENE GLYCOL 3350 17 GRAM(S): 17 POWDER, FOR SOLUTION ORAL at 12:53
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
## 2021-07-20 NOTE — PROGRESS NOTE ADULT - ASSESSMENT
TURNER HERNANDEZ is a 30M with PMH of TBI s/p left frontoparietal craniectomy (3/2020) presented to [facility] on 6/18/21 for scheduled left cranioplasty, with right Hemiparesis, Spasticity, dysmetria, gait and aDL impairment.       L cranioplasty  - hx TBI 3/2020 s/p L craniectomy, s/p PEG (removed), s/p trach (decannulated)-incisions healing well.  - Cont.  comprehensive rehab program of PT/OT/SLP - 3 hours a day, 5 days a week. P&O as needed   --K-taping for right shoulder ordered in OT    Spasticity  - tizanidine 4mg qhs. Tolerating it well     Pain  - Tylenol PRN  - Avoid sedating medications that may interfere with cognitive recovery    GI / Bowel  - Stopped famotidine.  - senna 2 tab qhs & bisacodyl  - PRN  - prune juice   - Added polyethylene glycol daily        / Bladder  -continent  - PVR-low-    Skin / Pressure injury  - Monitor Incisions:    - Turn q2 hours in bed while awake, air mattress  - nursing to monitor skin qShift  - soft heel protectors  - skin barrier cream PRN    Diet/Dysphagia:  - Diet Consistency: Regular  - Aspiration Precautions  - SLP consult for swallow function evaluation and treatment  - Nutrition consult    DVT prophylaxis:   - lovenox--  - SCDs    Labs:  CBC, CMP 7/19        Outpatient Follow-up:  Nely Conroy)  [facility] Neurosurgery  General  [address] [place] [address], [address]  [place] [address]  Phone: [phone]  Fax: [phone]  Follow Up Time: Routine  ---------------    IDT 7/13--  SW: resides with mother, and brother in [facility] 5h/d x 5d/week. Pt has medicare coverage.   OT: Sup. e/ UBD & grooming;  UBD - Total A, LBD - Min A. Mod A with toilet. Total A shower & bathing; Goal Min & supervision I e/d and Min A transf  PT: Min A transf; Mod A Amb. 30ft HC. & KAFO--needs assistance to advance leg. Mod A/Max A 2 steps.  Goal Mod I transf and ambulation 150ft with Sup.   Speech: Reg/thins diet; mild high level cognitive deficits, Mild Dysarthria.  Good awareness and safety. Increase in Quantitative measurement.   ELOS: 7/28 home TURNER HERNANDEZ is a 30M with PMH of TBI s/p left frontoparietal craniectomy (3/2020) presented to [facility] on 6/18/21 for scheduled left cranioplasty, with right Hemiparesis, Spasticity, dysmetria, gait and aDL impairment.       L cranioplasty  - hx TBI 3/2020 s/p L craniectomy, s/p PEG (removed), s/p trach (decannulated)-incisions healing well.  - Cont.  comprehensive rehab program of PT/OT/SLP - 3 hours a day, 5 days a week. P&O as needed   --K-taping for right shoulder ordered in OT    Spasticity  - tizanidine 4mg qhs. Tolerating it well     Pain  - Tylenol PRN  - Avoid sedating medications that may interfere with cognitive recovery    GI / Bowel  - Stopped famotidine.  - senna 2 tab qhs & bisacodyl  - PRN  - prune juice   - Added polyethylene glycol daily        / Bladder  -continent  - PVR-low-    Skin / Pressure injury  - Monitor Incisions:    - Turn q2 hours in bed while awake, air mattress  - nursing to monitor skin qShift  - soft heel protectors  - skin barrier cream PRN    Diet/Dysphagia:  - Diet Consistency: Regular  - Aspiration Precautions  - SLP consult for swallow function evaluation and treatment  - Nutrition consult    DVT prophylaxis:   - lovenox--  - SCDs    Labs:  CBC, CMP 7/19        Outpatient Follow-up:  Nely Conroy)  [facility] Neurosurgery  General  [address] [place] [address], [address]  [place] [address]  Phone: [phone]  Fax: [phone]  Follow Up Time: Routine  ---------------    IDT 7/20--  SW: resides with mother, and brother in , [facility] 5h/d x 5d/week. Pt has medicare coverage.   OT: Sup. e/ UBD & grooming;  UBD - Total A, LBD - Min A. Mod A with toilet. Total A shower & bathing; Goal Min & supervision I e/d and Min A transf  PT: Min A transf; Mod A Amb. 30ft HC. & KAFO--needs assistance to advance leg. Mod A/Max A 2 steps.  Goal Mod I transf and ambulation 150ft with Sup.   Speech: Reg/thins diet; mild high level cognitive deficits, Mild Dysarthria.  Good awareness and safety. Increase in Quantitative measurement.   ELOS: 7/28 home TURNER HERNANDEZ is a 30M with PMH of TBI s/p left frontoparietal craniectomy (3/2020) presented to [facility] on 6/18/21 for scheduled left cranioplasty, with right Hemiparesis, Spasticity, dysmetria, gait and aDL impairment.       L cranioplasty  - hx TBI 3/2020 s/p L craniectomy, s/p PEG (removed), s/p trach (decannulated)-incisions healing well.  - Cont.  comprehensive rehab program of PT/OT/SLP - 3 hours a day, 5 days a week. P&O as needed   --K-taping for right shoulder ordered in OT    Spasticity  - tizanidine 4mg qhs. Tolerating it well     Pain  - Tylenol PRN  - Avoid sedating medications that may interfere with cognitive recovery    GI / Bowel  - Stopped famotidine.  - senna 2 tab qhs & bisacodyl  - PRN  - prune juice   - Added polyethylene glycol daily        / Bladder  -continent  - PVR-low-    Skin / Pressure injury  - Monitor Incisions:    - Turn q2 hours in bed while awake, air mattress  - nursing to monitor skin qShift  - soft heel protectors  - skin barrier cream PRN    Diet/Dysphagia:  - Diet Consistency: Regular  - Aspiration Precautions  - SLP consult for swallow function evaluation and treatment  - Nutrition consult    DVT prophylaxis:   - lovenox--  - SCDs    Labs:  CBC, CMP 7/19        Outpatient Follow-up:  Nely Conroy)  [facility] Neurosurgery  General  [address] [place] [address], [address]  [place] [address]  Phone: [phone]  Fax: [phone]  Follow Up Time: Routine  ---------------    IDT 7/20--  SW: resides with mother, and brother in [facility] 5h/d x 5d/week. Pt has medicare coverage.   OT: Sup. e/ UBD & grooming;  LBD - Min A. Mod A with toilet. Total A shower & bathing; Goal Min & supervision I e/d and Min A transf  PT: Min A transf; Mod A Amb. 30ft HC.. tot A 2 steps.  Goal Mod I transf and ambulation 150ft with Sup.   Speech: Reg/thins diet; mild high level cognitive deficits, Mild Dysarthria.  Good awareness and safety. Increase in Quantitative measurement.   ELOS: 7/28 home

## 2021-07-21 PROCEDURE — 99232 SBSQ HOSP IP/OBS MODERATE 35: CPT

## 2021-07-21 RX ORDER — SENNA PLUS 8.6 MG/1
2 TABLET ORAL AT BEDTIME
Refills: 0 | Status: DISCONTINUED | OUTPATIENT
Start: 2021-07-21 | End: 2021-07-22

## 2021-07-21 RX ADMIN — TIZANIDINE 4 MILLIGRAM(S): 4 TABLET ORAL at 21:20

## 2021-07-21 RX ADMIN — ENOXAPARIN SODIUM 40 MILLIGRAM(S): 100 INJECTION SUBCUTANEOUS at 21:20

## 2021-07-21 RX ADMIN — Medication 10 MILLIGRAM(S): at 21:21

## 2021-07-21 RX ADMIN — POLYETHYLENE GLYCOL 3350 17 GRAM(S): 17 POWDER, FOR SOLUTION ORAL at 12:22

## 2021-07-21 NOTE — PROGRESS NOTE ADULT - ASSESSMENT
TURNER HERNANDEZ is a 30M with PMH of TBI s/p left frontoparietal craniectomy (3/2020) presented to [facility] on 6/18/21 for scheduled left cranioplasty, with right Hemiparesis, Spasticity, dysmetria, gait and aDL impairment.       L cranioplasty  - hx TBI 3/2020 s/p L craniectomy, s/p PEG (removed), s/p trach (decannulated)-incisions healing well.  - Cont.  comprehensive rehab program of PT/OT/SLP - 3 hours a day, 5 days a week. P&O as needed   --K-taping for right shoulder ordered in OT    Spasticity  - tizanidine 4mg qhs. Tolerating it well     Pain  - Tylenol PRN  - Avoid sedating medications that may interfere with cognitive recovery    GI / Bowel  - Stopped famotidine.  - senna 2 tab qhs--will schedule daily  --cont. bisacodyl  - PRN  - prune juice   - Cont. polyethylene glycol daily        / Bladder  -continent  - PVR-low-    Skin / Pressure injury  - Monitor Incisions:    - Turn q2 hours in bed while awake, air mattress  - nursing to monitor skin qShift  - soft heel protectors  - skin barrier cream PRN    Diet/Dysphagia:  - Diet Consistency: Regular  - Aspiration Precautions  - SLP consult for swallow function evaluation and treatment  - Nutrition consult    DVT prophylaxis:   - lovenox--  - SCDs    Labs:  CBC, CMP 7/19        Outpatient Follow-up:  Nely Conroy)  [facility] Neurosurgery  General  [address] [place] [address], [address]  [place] [address]  Phone: [phone]  Fax: [phone]  Follow Up Time: Routine  ---------------    IDT 7/20--  SW: resides with mother, and brother in , [facility] 5h/d x 5d/week. Pt has medicare coverage.   OT: Sup. e/ UBD & grooming;  LBD - Min A. Mod A with toilet. Total A shower & bathing; Goal Min & supervision I e/d and Min A transf  PT: Min A transf; Mod A Amb. 30ft HC.. tot A 2 steps.  Goal Mod I transf and ambulation 150ft with Sup.   Speech: Reg/thins diet; mild high level cognitive deficits, Mild Dysarthria.  Good awareness and safety. Increase in Quantitative measurement.   ELOS: 7/28 home
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
## 2021-07-22 LAB
ALBUMIN SERPL ELPH-MCNC: 3.8 G/DL — SIGNIFICANT CHANGE UP (ref 3.3–5)
ALP SERPL-CCNC: 65 U/L — SIGNIFICANT CHANGE UP (ref 30–120)
ALT FLD-CCNC: 36 U/L DA — SIGNIFICANT CHANGE UP (ref 10–60)
ANION GAP SERPL CALC-SCNC: 6 MMOL/L — SIGNIFICANT CHANGE UP (ref 5–17)
AST SERPL-CCNC: 25 U/L — SIGNIFICANT CHANGE UP (ref 10–40)
BASOPHILS # BLD AUTO: 0.06 K/UL — SIGNIFICANT CHANGE UP (ref 0–0.2)
BASOPHILS NFR BLD AUTO: 1.3 % — SIGNIFICANT CHANGE UP (ref 0–2)
BILIRUB SERPL-MCNC: 0.4 MG/DL — SIGNIFICANT CHANGE UP (ref 0.2–1.2)
BUN SERPL-MCNC: 12 MG/DL — SIGNIFICANT CHANGE UP (ref 7–23)
CALCIUM SERPL-MCNC: 9.4 MG/DL — SIGNIFICANT CHANGE UP (ref 8.4–10.5)
CHLORIDE SERPL-SCNC: 103 MMOL/L — SIGNIFICANT CHANGE UP (ref 96–108)
CO2 SERPL-SCNC: 30 MMOL/L — SIGNIFICANT CHANGE UP (ref 22–31)
CREAT SERPL-MCNC: 0.93 MG/DL — SIGNIFICANT CHANGE UP (ref 0.5–1.3)
EOSINOPHIL # BLD AUTO: 0.13 K/UL — SIGNIFICANT CHANGE UP (ref 0–0.5)
EOSINOPHIL NFR BLD AUTO: 2.8 % — SIGNIFICANT CHANGE UP (ref 0–6)
GLUCOSE SERPL-MCNC: 92 MG/DL — SIGNIFICANT CHANGE UP (ref 70–99)
HCT VFR BLD CALC: 39.6 % — SIGNIFICANT CHANGE UP (ref 39–50)
HGB BLD-MCNC: 13.8 G/DL — SIGNIFICANT CHANGE UP (ref 13–17)
IMM GRANULOCYTES NFR BLD AUTO: 0.2 % — SIGNIFICANT CHANGE UP (ref 0–1.5)
LYMPHOCYTES # BLD AUTO: 1.94 K/UL — SIGNIFICANT CHANGE UP (ref 1–3.3)
LYMPHOCYTES # BLD AUTO: 41.4 % — SIGNIFICANT CHANGE UP (ref 13–44)
MCHC RBC-ENTMCNC: 33.3 PG — SIGNIFICANT CHANGE UP (ref 27–34)
MCHC RBC-ENTMCNC: 34.8 GM/DL — SIGNIFICANT CHANGE UP (ref 32–36)
MCV RBC AUTO: 95.4 FL — SIGNIFICANT CHANGE UP (ref 80–100)
MONOCYTES # BLD AUTO: 0.33 K/UL — SIGNIFICANT CHANGE UP (ref 0–0.9)
MONOCYTES NFR BLD AUTO: 7 % — SIGNIFICANT CHANGE UP (ref 2–14)
NEUTROPHILS # BLD AUTO: 2.22 K/UL — SIGNIFICANT CHANGE UP (ref 1.8–7.4)
NEUTROPHILS NFR BLD AUTO: 47.3 % — SIGNIFICANT CHANGE UP (ref 43–77)
NRBC # BLD: 0 /100 WBCS — SIGNIFICANT CHANGE UP (ref 0–0)
PLATELET # BLD AUTO: 214 K/UL — SIGNIFICANT CHANGE UP (ref 150–400)
POTASSIUM SERPL-MCNC: 4.8 MMOL/L — SIGNIFICANT CHANGE UP (ref 3.5–5.3)
POTASSIUM SERPL-SCNC: 4.8 MMOL/L — SIGNIFICANT CHANGE UP (ref 3.5–5.3)
PROT SERPL-MCNC: 7.4 G/DL — SIGNIFICANT CHANGE UP (ref 6–8.3)
RBC # BLD: 4.15 M/UL — LOW (ref 4.2–5.8)
RBC # FLD: 10.8 % — SIGNIFICANT CHANGE UP (ref 10.3–14.5)
SODIUM SERPL-SCNC: 139 MMOL/L — SIGNIFICANT CHANGE UP (ref 135–145)
WBC # BLD: 4.69 K/UL — SIGNIFICANT CHANGE UP (ref 3.8–10.5)
WBC # FLD AUTO: 4.69 K/UL — SIGNIFICANT CHANGE UP (ref 3.8–10.5)

## 2021-07-22 PROCEDURE — 99232 SBSQ HOSP IP/OBS MODERATE 35: CPT

## 2021-07-22 RX ADMIN — Medication 10 MILLIGRAM(S): at 21:33

## 2021-07-22 RX ADMIN — TIZANIDINE 4 MILLIGRAM(S): 4 TABLET ORAL at 21:33

## 2021-07-22 RX ADMIN — ENOXAPARIN SODIUM 40 MILLIGRAM(S): 100 INJECTION SUBCUTANEOUS at 21:33

## 2021-07-22 NOTE — PROGRESS NOTE ADULT - SUBJECTIVE AND OBJECTIVE BOX
Patient is a 30y old  Male who presents with a chief complaint of s/p cranioplasty (22 Jul 2021 08:41)      SUBJECTIVE / OVERNIGHT EVENTS:  Pt seen and examined at bedside. No acute events overnight.  Pt denies cp, palpitations, sob, abd pain, N/V, fever, chills.    ROS:  All other review of systems negative    Allergies    No Known Allergies    Intolerances        MEDICATIONS  (STANDING):  bisacodyl 10 milliGRAM(s) Oral at bedtime  enoxaparin Injectable 40 milliGRAM(s) SubCutaneous at bedtime  polyethylene glycol 3350 17 Gram(s) Oral daily  tiZANidine 4 milliGRAM(s) Oral at bedtime    MEDICATIONS  (PRN):  acetaminophen   Tablet .. 650 milliGRAM(s) Oral every 6 hours PRN Mild Pain (1 - 3)      Vital Signs Last 24 Hrs  T(C): 36.6 (22 Jul 2021 07:57), Max: 36.6 (21 Jul 2021 20:47)  T(F): 97.8 (22 Jul 2021 07:57), Max: 97.8 (21 Jul 2021 20:47)  HR: 62 (22 Jul 2021 07:57) (62 - 67)  BP: 110/67 (22 Jul 2021 07:57) (110/67 - 111/71)  BP(mean): --  RR: 14 (22 Jul 2021 07:57) (14 - 14)  SpO2: 99% (22 Jul 2021 07:57) (97% - 99%)  CAPILLARY BLOOD GLUCOSE        I&O's Summary    21 Jul 2021 07:01  -  22 Jul 2021 07:00  --------------------------------------------------------  IN: 0 mL / OUT: 400 mL / NET: -400 mL        PHYSICAL EXAM:  GENERAL: NAD, well-developed  HEAD:  Atraumatic, Normocephalic  EYES: EOMI, PERRLA, conjunctiva and sclera clear  NECK: Supple, No JVD  CHEST/LUNG: Clear to auscultation bilaterally; No wheeze, nonlabored breathing  HEART: Regular rate and rhythm; No murmurs, rubs, or gallops  ABDOMEN: Soft, Nontender, Nondistended; Bowel sounds present  EXTREMITIES:  2+ Peripheral Pulses, No clubbing, cyanosis, or edema  NEUROLOGY: AAOx3, right sided weakness  PSYCH: calm, appropriate mood    LABS:                        13.8   4.69  )-----------( 214      ( 22 Jul 2021 06:30 )             39.6     07-22    139  |  103  |  12  ----------------------------<  92  4.8   |  30  |  0.93    Ca    9.4      22 Jul 2021 06:30    TPro  7.4  /  Alb  3.8  /  TBili  0.4  /  DBili  x   /  AST  25  /  ALT  36  /  AlkPhos  65  07-22              RADIOLOGY & ADDITIONAL TESTS:  Results Reviewed:   Imaging Personally Reviewed:  Electrocardiogram Personally Reviewed:    COORDINATION OF CARE:  Care Discussed with Consultants/Other Providers [Y/N]:  Prior or Outpatient Records Reviewed [Y/N]:
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
## 2021-07-22 NOTE — PROGRESS NOTE ADULT - ASSESSMENT
TURNER HERNANDEZ is a 30M with PMH of TBI s/p left frontoparietal craniectomy (3/2020) presented to [facility] on 6/18/21 for scheduled left cranioplasty, with right Hemiparesis, Spasticity, dysmetria, gait and aDL impairment.       L cranioplasty  - hx TBI 3/2020 s/p L craniectomy, s/p PEG (removed), s/p trach (decannulated)-incisions healing well.  - Cont.  comprehensive rehab program of PT/OT/SLP - 3 hours a day, 5 days a week. P&O as needed   --K-taping for right shoulder ordered in OT    Spasticity  - tizanidine 4mg qhs. Tolerating it well     Pain  - Tylenol PRN  - Avoid sedating medications that may interfere with cognitive recovery    GI / Bowel  - Stopped famotidine.  - senna 2 tab qhs--will schedule daily  --cont. bisacodyl  - PRN  - prune juice   - Cont. polyethylene glycol daily   - Will add enema if they pt doesn't have a BM.        / Bladder  -continent  - PVR-low-    Skin / Pressure injury  - Monitor Incisions:    - Turn q2 hours in bed while awake, air mattress  - nursing to monitor skin qShift  - soft heel protectors  - skin barrier cream PRN    Diet/Dysphagia:  - Diet Consistency: Regular  - Aspiration Precautions  - SLP consult for swallow function evaluation and treatment  - Nutrition consult    DVT prophylaxis:   - lovenox--  - SCDs    Labs:  CBC, CMP 7/19        Outpatient Follow-up:  Nely Conroy)  [facility] Neurosurgery  General  [address] [place] [address], [address]  [place] [address]  Phone: [phone]  Fax: [phone]  Follow Up Time: Routine  ---------------    IDT 7/20--  SW: resides with mother, and brother in [facility] 5h/d x 5d/week. Pt has medicare coverage.   OT: Sup. e/ UBD & grooming;  LBD - Min A. Mod A with toilet. Total A shower & bathing; Goal Min & supervision I e/d and Min A transf  PT: Min A transf; Mod A Amb. 30ft HC.. tot A 2 steps.  Goal Mod I transf and ambulation 150ft with Sup.   Speech: Reg/thins diet; mild high level cognitive deficits, Mild Dysarthria.  Good awareness and safety. Increase in Quantitative measurement.   ELOS: 7/28 home TURNER HERNANDEZ is a 30M with PMH of TBI s/p left frontoparietal craniectomy (3/2020) presented to [facility] on 6/18/21 for scheduled left cranioplasty, with right Hemiparesis, Spasticity, dysmetria, gait and aDL impairment.       L cranioplasty  - hx TBI 3/2020 s/p L craniectomy, s/p PEG (removed), s/p trach (decannulated)-incisions healing well.  - Cont.  comprehensive rehab program of PT/OT/SLP - 3 hours a day, 5 days a week. P&O as needed   --K-taping for right shoulder ordered in OT    Spasticity  - tizanidine 4mg qhs. Tolerating it well     Pain  - Tylenol PRN  - Avoid sedating medications that may interfere with cognitive recovery    GI / Bowel  - Stopped famotidine.  - d/c senna   --cont. bisacodyl  -- will schedule daily  - prune juice   - Cont. polyethylene glycol daily      / Bladder  -continent  - PVR-low-    Skin / Pressure injury  - Monitor Incisions:    - Turn q2 hours in bed while awake, air mattress  - nursing to monitor skin qShift  - soft heel protectors  - skin barrier cream PRN    Diet/Dysphagia:  - Diet Consistency: Regular  - Aspiration Precautions  - SLP consult for swallow function evaluation and treatment  - Nutrition consult    DVT prophylaxis:   - lovenox--  - SCDs    Labs:  CBC, CMP 7/19        Outpatient Follow-up:  Nely Conroy)  [facility] Neurosurgery  General  [address] [place] [address], [address]  [place] [address]  Phone: [phone]  Fax: [phone]  Follow Up Time: Routine  ---------------    IDT 7/20--  SW: resides with mother, and brother in [facility] 5h/d x 5d/week. Pt has medicare coverage.   OT: Sup. e/ UBD & grooming;  LBD - Min A. Mod A with toilet. Total A shower & bathing; Goal Min & supervision I e/d and Min A transf  PT: Min A transf; Mod A Amb. 30ft HC.. tot A 2 steps.  Goal Mod I transf and ambulation 150ft with Sup.   Speech: Reg/thins diet; mild high level cognitive deficits, Mild Dysarthria.  Good awareness and safety. Increase in Quantitative measurement.   ELOS: 7/28 home

## 2021-07-22 NOTE — PROGRESS NOTE ADULT - ASSESSMENT
29 y/o M PMH HTN, TBI s/p left frontoparietal craniectomy (3/2020) presented to [facility] on 6/18/21 for scheduled left frontoparietal cranioplasty, now with functional deficits admitted for inpatient rehab- pt/ot/dvt ppx    #L frontoparietal cranioplasty  -Continue comprehensive acute rehab   -Fall, seizure precautions  -Tizanidine qhs    #HTN  -Normotensive off meds  -Monitor    #DVT ppx  -Lovenox

## 2021-07-23 PROCEDURE — 99232 SBSQ HOSP IP/OBS MODERATE 35: CPT

## 2021-07-23 RX ADMIN — TIZANIDINE 4 MILLIGRAM(S): 4 TABLET ORAL at 21:17

## 2021-07-23 RX ADMIN — ENOXAPARIN SODIUM 40 MILLIGRAM(S): 100 INJECTION SUBCUTANEOUS at 21:17

## 2021-07-23 RX ADMIN — Medication 10 MILLIGRAM(S): at 21:16

## 2021-07-23 NOTE — PROGRESS NOTE ADULT - ASSESSMENT
TURNER HERNANDEZ is a 30M with PMH of TBI s/p left frontoparietal craniectomy (3/2020) presented to [facility] on 6/18/21 for scheduled left cranioplasty, with right Hemiparesis, Spasticity, dysmetria, gait and aDL impairment.       L cranioplasty  - hx TBI 3/2020 s/p L craniectomy, s/p PEG (removed), s/p trach (decannulated)-incisions healing well.  - Cont.  comprehensive rehab program of PT/OT/SLP - 3 hours a day, 5 days a week. P&O as needed   --K-taping for right shoulder ordered in OT    Spasticity  - tizanidine 4mg qhs. Tolerating it well     #HTN  -BP is stable. No meds   -Monitor    Pain  - Tylenol PRN  - Avoid sedating medications that may interfere with cognitive recovery    GI / Bowel  - Stopped famotidine.  - d/c senna   --cont. bisacodyl  -- will schedule daily  - prune juice   - Cont. polyethylene glycol daily      / Bladder  -continent  - PVR-low-    Skin / Pressure injury  - Monitor Incisions:    - Turn q2 hours in bed while awake, air mattress  - nursing to monitor skin qShift  - soft heel protectors  - skin barrier cream PRN    Diet/Dysphagia:  - Diet Consistency: Regular  - Aspiration Precautions  - SLP consult for swallow function evaluation and treatment  - Nutrition consult    DVT prophylaxis:   - lovenox--  - SCDs      Outpatient Follow-up:  Nely Conroy)  [facility] Neurosurgery  General  [address] [place] [address], [address]  [place] [address]  Phone: [phone]  Fax: [phone]  Follow Up Time: Routine  ---------------    IDT 7/20--  SW: resides with mother, and brother in , [facility] 5h/d x 5d/week. Pt has medicare coverage.   OT: Sup. e/ UBD & grooming;  LBD - Min A. Mod A with toilet. Total A shower & bathing; Goal Min & supervision I e/d and Min A transf  PT: Min A transf; Mod A Amb. 30ft HC.. tot A 2 steps.  Goal Mod I transf and ambulation 150ft with Sup.   Speech: Reg/thins diet; mild high level cognitive deficits, Mild Dysarthria.  Good awareness and safety. Increase in Quantitative measurement.   ELOS: 7/28 home TURNER HERNANDEZ is a 30M with PMH of TBI s/p left frontoparietal craniectomy (3/2020) presented to [facility] on 6/18/21 for scheduled left cranioplasty, with right Hemiparesis, Spasticity, dysmetria, gait and aDL impairment.       L cranioplasty  - hx TBI 3/2020 s/p L craniectomy, s/p PEG (removed), s/p trach (decannulated)-incisions healing well.  - Cont.  comprehensive rehab program of PT/OT/SLP - 3 hours a day, 5 days a week. P&O as needed   --K-taping for right shoulder ordered in OT    Spasticity  - tizanidine 4mg qhs. Tolerating it well     #HTN  -BP is stable. No meds   -Monitor    Pain  - Tylenol PRN  - Avoid sedating medications that may interfere with cognitive recovery    GI / Bowel  - Stopped famotidine.  - d/c senna   --cont. bisacodyl daily  - prune juice   - Cont. polyethylene glycol daily      / Bladder  -continent  - PVR-low-    Skin / Pressure injury  - Monitor Incisions:    - Turn q2 hours in bed while awake, air mattress  - nursing to monitor skin qShift  - soft heel protectors  - skin barrier cream PRN    Diet/Dysphagia:  - Diet Consistency: Regular  - Aspiration Precautions  - SLP consult for swallow function evaluation and treatment  - Nutrition consult    DVT prophylaxis:   - lovenox--  - SCDs      Outpatient Follow-up:  Nely Conroy)  [facility] Neurosurgery  General  [address] [place] [address], [address]  [place] [address]  Phone: [phone]  Fax: [phone]  Follow Up Time: Routine  ---------------    IDT 7/20--  SW: resides with mother, and brother in [facility] 5h/d x 5d/week. Pt has medicare coverage.   OT: Sup. e/ UBD & grooming;  LBD - Min A. Mod A with toilet. Total A shower & bathing; Goal Min & supervision I e/d and Min A transf  PT: Min A transf; Mod A Amb. 30ft HC.. tot A 2 steps.  Goal Mod I transf and ambulation 150ft with Sup.   Speech: Reg/thins diet; mild high level cognitive deficits, Mild Dysarthria.  Good awareness and safety. Increase in Quantitative measurement.   ELOS: 7/28 home
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
## 2021-07-24 PROCEDURE — 99232 SBSQ HOSP IP/OBS MODERATE 35: CPT

## 2021-07-24 RX ADMIN — TIZANIDINE 4 MILLIGRAM(S): 4 TABLET ORAL at 21:25

## 2021-07-24 RX ADMIN — Medication 10 MILLIGRAM(S): at 21:25

## 2021-07-24 RX ADMIN — ENOXAPARIN SODIUM 40 MILLIGRAM(S): 100 INJECTION SUBCUTANEOUS at 21:25

## 2021-07-24 NOTE — PROGRESS NOTE ADULT - SUBJECTIVE AND OBJECTIVE BOX
Patient is a 30y old  Male who presents with a chief complaint of s/p cranioplasty (23 Jul 2021 08:17)      SUBJECTIVE / OVERNIGHT EVENTS:  Pt seen and examined at bedside. No acute events overnight.  Pt denies cp, palpitations, sob, abd pain, N/V, fever, chills.    ROS:  All other review of systems negative    Allergies    No Known Allergies    Intolerances        MEDICATIONS  (STANDING):  bisacodyl 10 milliGRAM(s) Oral at bedtime  enoxaparin Injectable 40 milliGRAM(s) SubCutaneous at bedtime  polyethylene glycol 3350 17 Gram(s) Oral daily  tiZANidine 4 milliGRAM(s) Oral at bedtime    MEDICATIONS  (PRN):  acetaminophen   Tablet .. 650 milliGRAM(s) Oral every 6 hours PRN Mild Pain (1 - 3)      Vital Signs Last 24 Hrs  T(C): 36.7 (24 Jul 2021 07:35), Max: 36.7 (24 Jul 2021 07:35)  T(F): 98 (24 Jul 2021 07:35), Max: 98 (24 Jul 2021 07:35)  HR: 60 (24 Jul 2021 07:35) (60 - 80)  BP: 121/73 (24 Jul 2021 07:35) (111/68 - 121/73)  BP(mean): --  RR: 14 (24 Jul 2021 07:35) (14 - 15)  SpO2: 99% (24 Jul 2021 07:35) (96% - 99%)  CAPILLARY BLOOD GLUCOSE        I&O's Summary    23 Jul 2021 07:01  -  24 Jul 2021 07:00  --------------------------------------------------------  IN: 0 mL / OUT: 300 mL / NET: -300 mL        PHYSICAL EXAM:  GENERAL: NAD, well-developed  EYES: EOMI, PERRLA, conjunctiva and sclera clear  NECK: Supple, No JVD  CHEST/LUNG: Clear to auscultation bilaterally; No wheeze, nonlabored breathing  HEART: Regular rate and rhythm; No murmurs, rubs, or gallops  ABDOMEN: Soft, Nontender, Nondistended; Bowel sounds present  EXTREMITIES:  2+ Peripheral Pulses, No clubbing, cyanosis, or edema  NEUROLOGY: AAOx3, right sided weakness  PSYCH: calm, appropriate mood    LABS:                    RADIOLOGY & ADDITIONAL TESTS:  Results Reviewed:   Imaging Personally Reviewed:  Electrocardiogram Personally Reviewed:    COORDINATION OF CARE:  Care Discussed with Consultants/Other Providers [Y/N]:  Prior or Outpatient Records Reviewed [Y/N]:

## 2021-07-24 NOTE — PROGRESS NOTE ADULT - SUBJECTIVE AND OBJECTIVE BOX
No overnight events.  no new complaints, slept well     REVIEW OF SYSTEMS  Constitutional - No fever,  No fatigue  Neurological - No headaches, No loss of strength  Musculoskeletal - No joint pain, No joint swelling, No muscle pain    VITALS  T(C): 36.7 (07-24-21 @ 07:35), Max: 36.7 (07-24-21 @ 07:35)  HR: 60 (07-24-21 @ 07:35) (60 - 80)  BP: 121/73 (07-24-21 @ 07:35) (111/68 - 121/73)  RR: 14 (07-24-21 @ 07:35) (14 - 15)  SpO2: 99% (07-24-21 @ 07:35) (96% - 99%)  Wt(kg): --       MEDICATIONS   acetaminophen   Tablet .. 650 milliGRAM(s) every 6 hours PRN  bisacodyl 10 milliGRAM(s) at bedtime  enoxaparin Injectable 40 milliGRAM(s) at bedtime  polyethylene glycol 3350 17 Gram(s) daily  tiZANidine 4 milliGRAM(s) at bedtime      RECENT LABS/IMAGING                        ---------  PHYSICAL EXAM  Constitutional - NAD, Comfortable, in wheelchair   Pulm - Breathing comfortably, No wheezing  Abd - Soft, NTND  Extremities - No edema, No calf tenderness  Neurologic Exam -                    Cognitive - Awake, Alert, oriented x 3     Communication - Fluent     Motor - right side weakness      Sensory - Intact to LT  Psychiatric - Mood WNL, Affect WNL    ASSESSMENT/PLAN  30y Male PMH of TBI s/p left frontoparietal craniectomy (3/2020) presented to [facility] on 6/18/21 for scheduled left cranioplasty, with right Hemiparesis, Spasticity, dysmetria, gait and aDL impairment.   Continue current medical management  tizanidine for spasticity   Pain - Tylenol PRN  DVT PPX - lovenox   Continue 3hrs a day of comprehensive rehab program.

## 2021-07-24 NOTE — PROGRESS NOTE ADULT - ASSESSMENT
31 y/o M PMH HTN, TBI s/p left frontoparietal craniectomy (3/2020) presented to [facility] on 6/18/21 for scheduled left frontoparietal cranioplasty, now with functional deficits admitted for inpatient rehab- pt/ot/dvt ppx    #L frontoparietal cranioplasty  -Continue comprehensive acute rehab   -Fall, seizure precautions  -Tizanidine qhs    #HTN  -Normotensive off meds  -Monitor    #DVT ppx  -Lovenox

## 2021-07-25 PROCEDURE — 99232 SBSQ HOSP IP/OBS MODERATE 35: CPT

## 2021-07-25 RX ADMIN — ENOXAPARIN SODIUM 40 MILLIGRAM(S): 100 INJECTION SUBCUTANEOUS at 21:54

## 2021-07-25 RX ADMIN — Medication 10 MILLIGRAM(S): at 21:54

## 2021-07-25 RX ADMIN — TIZANIDINE 4 MILLIGRAM(S): 4 TABLET ORAL at 21:54

## 2021-07-25 NOTE — PROGRESS NOTE ADULT - SUBJECTIVE AND OBJECTIVE BOX
No overnight events.      REVIEW OF SYSTEMS  Constitutional - No fever,  No fatigue  Neurological - No headaches, No loss of strength  Musculoskeletal - No joint pain, No joint swelling, No muscle pain    VITALS  T(C): 36.6 (07-25-21 @ 08:09), Max: 36.8 (07-24-21 @ 19:32)  HR: 58 (07-25-21 @ 08:09) (58 - 69)  BP: 112/70 (07-25-21 @ 08:09) (112/70 - 115/72)  RR: 16 (07-25-21 @ 08:09) (16 - 16)  SpO2: 98% (07-25-21 @ 08:09) (98% - 98%)  Wt(kg): --       MEDICATIONS   acetaminophen   Tablet .. 650 milliGRAM(s) every 6 hours PRN  bisacodyl 10 milliGRAM(s) at bedtime  enoxaparin Injectable 40 milliGRAM(s) at bedtime  polyethylene glycol 3350 17 Gram(s) daily  tiZANidine 4 milliGRAM(s) at bedtime      RECENT LABS/IMAGING      ---------  PHYSICAL EXAM  Constitutional - NAD, Comfortable, in wheelchair   Pulm - Breathing comfortably, No wheezing  Abd - Soft, NTND  Extremities - No edema, No calf tenderness  Neurologic Exam -                    Cognitive - Awake, Alert, oriented x 3     Communication - Fluent     Motor - right side weakness      Sensory - Intact to LT  Psychiatric - Mood WNL, Affect WNL    ASSESSMENT/PLAN  30y Male PMH of TBI s/p left frontoparietal craniectomy (3/2020) presented to [facility] on 6/18/21 for scheduled left cranioplasty, with right Hemiparesis, Spasticity, dysmetria, gait and aDL impairment.   Continue current medical management  tizanidine for spasticity   Pain - Tylenol PRN  DVT PPX - lovenox   Continue 3hrs a day of comprehensive rehab program.

## 2021-07-26 LAB
ALBUMIN SERPL ELPH-MCNC: 3.7 G/DL — SIGNIFICANT CHANGE UP (ref 3.3–5)
ALP SERPL-CCNC: 88 U/L — SIGNIFICANT CHANGE UP (ref 40–120)
ALT FLD-CCNC: 36 U/L — SIGNIFICANT CHANGE UP (ref 10–45)
ANION GAP SERPL CALC-SCNC: 7 MMOL/L — SIGNIFICANT CHANGE UP (ref 5–17)
AST SERPL-CCNC: 15 U/L — SIGNIFICANT CHANGE UP (ref 10–40)
BASOPHILS # BLD AUTO: 0.06 K/UL — SIGNIFICANT CHANGE UP (ref 0–0.2)
BASOPHILS NFR BLD AUTO: 1.1 % — SIGNIFICANT CHANGE UP (ref 0–2)
BILIRUB SERPL-MCNC: 0.4 MG/DL — SIGNIFICANT CHANGE UP (ref 0.2–1.2)
BUN SERPL-MCNC: 10 MG/DL — SIGNIFICANT CHANGE UP (ref 7–23)
CALCIUM SERPL-MCNC: 9 MG/DL — SIGNIFICANT CHANGE UP (ref 8.4–10.5)
CHLORIDE SERPL-SCNC: 102 MMOL/L — SIGNIFICANT CHANGE UP (ref 96–108)
CO2 SERPL-SCNC: 30 MMOL/L — SIGNIFICANT CHANGE UP (ref 22–31)
CREAT SERPL-MCNC: 0.95 MG/DL — SIGNIFICANT CHANGE UP (ref 0.5–1.3)
EOSINOPHIL # BLD AUTO: 0.17 K/UL — SIGNIFICANT CHANGE UP (ref 0–0.5)
EOSINOPHIL NFR BLD AUTO: 3.1 % — SIGNIFICANT CHANGE UP (ref 0–6)
GLUCOSE SERPL-MCNC: 91 MG/DL — SIGNIFICANT CHANGE UP (ref 70–99)
HCT VFR BLD CALC: 39.4 % — SIGNIFICANT CHANGE UP (ref 39–50)
HGB BLD-MCNC: 14 G/DL — SIGNIFICANT CHANGE UP (ref 13–17)
IMM GRANULOCYTES NFR BLD AUTO: 0.2 % — SIGNIFICANT CHANGE UP (ref 0–1.5)
LYMPHOCYTES # BLD AUTO: 2.22 K/UL — SIGNIFICANT CHANGE UP (ref 1–3.3)
LYMPHOCYTES # BLD AUTO: 40.4 % — SIGNIFICANT CHANGE UP (ref 13–44)
MCHC RBC-ENTMCNC: 33.5 PG — SIGNIFICANT CHANGE UP (ref 27–34)
MCHC RBC-ENTMCNC: 35.5 GM/DL — SIGNIFICANT CHANGE UP (ref 32–36)
MCV RBC AUTO: 94.3 FL — SIGNIFICANT CHANGE UP (ref 80–100)
MONOCYTES # BLD AUTO: 0.46 K/UL — SIGNIFICANT CHANGE UP (ref 0–0.9)
MONOCYTES NFR BLD AUTO: 8.4 % — SIGNIFICANT CHANGE UP (ref 2–14)
NEUTROPHILS # BLD AUTO: 2.57 K/UL — SIGNIFICANT CHANGE UP (ref 1.8–7.4)
NEUTROPHILS NFR BLD AUTO: 46.8 % — SIGNIFICANT CHANGE UP (ref 43–77)
NRBC # BLD: 0 /100 WBCS — SIGNIFICANT CHANGE UP (ref 0–0)
PLATELET # BLD AUTO: 228 K/UL — SIGNIFICANT CHANGE UP (ref 150–400)
POTASSIUM SERPL-MCNC: 4.2 MMOL/L — SIGNIFICANT CHANGE UP (ref 3.5–5.3)
POTASSIUM SERPL-SCNC: 4.2 MMOL/L — SIGNIFICANT CHANGE UP (ref 3.5–5.3)
PROT SERPL-MCNC: 7.4 G/DL — SIGNIFICANT CHANGE UP (ref 6–8.3)
RBC # BLD: 4.18 M/UL — LOW (ref 4.2–5.8)
RBC # FLD: 11.1 % — SIGNIFICANT CHANGE UP (ref 10.3–14.5)
SODIUM SERPL-SCNC: 139 MMOL/L — SIGNIFICANT CHANGE UP (ref 135–145)
WBC # BLD: 5.49 K/UL — SIGNIFICANT CHANGE UP (ref 3.8–10.5)
WBC # FLD AUTO: 5.49 K/UL — SIGNIFICANT CHANGE UP (ref 3.8–10.5)

## 2021-07-26 PROCEDURE — 99232 SBSQ HOSP IP/OBS MODERATE 35: CPT

## 2021-07-26 RX ADMIN — ENOXAPARIN SODIUM 40 MILLIGRAM(S): 100 INJECTION SUBCUTANEOUS at 22:17

## 2021-07-26 RX ADMIN — Medication 10 MILLIGRAM(S): at 22:17

## 2021-07-26 RX ADMIN — TIZANIDINE 4 MILLIGRAM(S): 4 TABLET ORAL at 22:17

## 2021-07-26 NOTE — PROGRESS NOTE ADULT - ASSESSMENT
TURNER HERNANDEZ is a 30M with PMH of TBI s/p left frontoparietal craniectomy (3/2020) presented to [facility] on 6/18/21 for scheduled left cranioplasty, with right Hemiparesis, Spasticity, dysmetria, gait and aDL impairment.       L cranioplasty  - hx TBI 3/2020 s/p L craniectomy, s/p PEG (removed), s/p trach (decannulated)-incisions healing well.  - Cont.  comprehensive rehab program of PT/OT/SLP - 3 hours a day, 5 days a week. P&O as needed   --K-taping for right shoulder ordered in OT    Spasticity  - tizanidine 4mg qhs. Tolerating it well     #HTN  -BP is stable. No meds   -Monitor    Pain  - Tylenol PRN  - Avoid sedating medications that may interfere with cognitive recovery    GI / Bowel  - Stopped famotidine.  - d/c senna   --cont. bisacodyl daily  - prune juice   - Cont. polyethylene glycol daily      / Bladder  -continent  - PVR-low-    Skin / Pressure injury  - Monitor Incisions:    - Turn q2 hours in bed while awake, air mattress  - nursing to monitor skin qShift  - soft heel protectors  - skin barrier cream PRN    Diet/Dysphagia:  - Diet Consistency: Regular  - Aspiration Precautions  - SLP consult for swallow function evaluation and treatment  - Nutrition consult    DVT prophylaxis:   - lovenox--  - SCDs      Outpatient Follow-up:  Nely Conroy)  [facility] Neurosurgery  General  [address] [place] [address], [address]  [place] [address]  Phone: [phone]  Fax: [phone]  Follow Up Time: Routine  ---------------    IDT 7/20--  SW: resides with mother, and brother in [facility] 5h/d x 5d/week. Pt has medicare coverage.   OT: Sup. e/ UBD & grooming;  LBD - Min A. Mod A with toilet. Total A shower & bathing; Goal Min & supervision I e/d and Min A transf  PT: Min A transf; Mod A Amb. 30ft HC.. tot A 2 steps.  Goal Mod I transf and ambulation 150ft with Sup.   Speech: Reg/thins diet; mild high level cognitive deficits, Mild Dysarthria.  Good awareness and safety. Increase in Quantitative measurement.   ELOS: 8/3

## 2021-07-27 PROCEDURE — 99232 SBSQ HOSP IP/OBS MODERATE 35: CPT

## 2021-07-27 RX ADMIN — ENOXAPARIN SODIUM 40 MILLIGRAM(S): 100 INJECTION SUBCUTANEOUS at 21:54

## 2021-07-27 RX ADMIN — TIZANIDINE 4 MILLIGRAM(S): 4 TABLET ORAL at 21:54

## 2021-07-27 RX ADMIN — Medication 10 MILLIGRAM(S): at 21:54
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
## 2021-07-27 NOTE — PROGRESS NOTE ADULT - ASSESSMENT
TURNER HERNANDEZ is a 30M with PMH of TBI s/p left frontoparietal craniectomy (3/2020) presented to [facility] on 6/18/21 for scheduled left cranioplasty, with right Hemiparesis, Spasticity, dysmetria, gait and aDL impairment.       L cranioplasty  - hx TBI 3/2020 s/p L craniectomy, s/p PEG (removed), s/p trach (decannulated)-incisions healing well.  - Cont.  comprehensive rehab program of PT/OT/SLP - 3 hours a day, 5 days a week. P&O as needed   --K-taping for right shoulder ordered in OT    Spasticity  - tizanidine 4mg qhs. Tolerating it well   -?Botox    #HTN  -BP is stable. No meds   -Monitor    Pain  - Tylenol PRN  - Avoid sedating medications that may interfere with cognitive recovery    GI / Bowel  --cont. bisacodyl and Miralax daily  - prune juice      / Bladder  -continent  - PVR-low-    Skin / Pressure injury  - Monitor Incisions:    - Turn q2 hours in bed while awake, air mattress  - nursing to monitor skin qShift  - soft heel protectors  - skin barrier cream PRN    Diet/Dysphagia:  - Diet Consistency: Regular  - Aspiration Precautions  - SLP consult for swallow function evaluation and treatment  - Nutrition consult    DVT prophylaxis:   - lovenox--  - SCDs      IDT 7/27  SW: resides with mother, and brother in [facility] 5h/d x 5d/week. Pt has medicare coverage.   OT: Sup. e/ UBD & grooming;  LBD - Min A. Mod A with toilet. Max A shower & bathing; Goal Min & supervision I e/d and Min A transf  PT: Min A transf; Mod A Amb. 40ft HC. 4steps mod A.  Goal Mod I transf and ambulation 150ft with Sup.   Speech: Reg/thins diet; mild high level cognitive deficits, Mild Dysarthria.  Good awareness and safety. Increase in Quantitative measurement.   ELOS: 8/3

## 2021-07-28 PROCEDURE — 99232 SBSQ HOSP IP/OBS MODERATE 35: CPT

## 2021-07-28 RX ADMIN — Medication 10 MILLIGRAM(S): at 21:20

## 2021-07-28 RX ADMIN — TIZANIDINE 4 MILLIGRAM(S): 4 TABLET ORAL at 21:20

## 2021-07-28 RX ADMIN — ENOXAPARIN SODIUM 40 MILLIGRAM(S): 100 INJECTION SUBCUTANEOUS at 21:20

## 2021-07-28 NOTE — PROGRESS NOTE ADULT - ASSESSMENT
TURNER HERNANDEZ is a 30M with PMH of TBI s/p left frontoparietal craniectomy (3/2020) presented to [facility] on 6/18/21 for scheduled left cranioplasty, with right Hemiparesis, Spasticity, dysmetria, gait and aDL impairment.       L cranioplasty  - hx TBI 3/2020 s/p L craniectomy, s/p PEG (removed), s/p trach (decannulated)-incisions healing well.  - Cont.  comprehensive rehab program of PT/OT/SLP - 3 hours a day, 5 days a week. P&O as needed   --K-taping for right shoulder ordered in OT    Spasticity  - tizanidine 4mg qhs. Tolerating it well   -Botox eval outpt    #HTN  -BP is stable. No meds   -Monitor    Pain  - Tylenol PRN  - Avoid sedating medications that may interfere with cognitive recovery    GI / Bowel  --cont. bisacodyl and Miralax daily  - prune juice      / Bladder  -continent  - PVR-low-    Skin / Pressure injury  - Monitor Incisions:    - Turn q2 hours in bed while awake, air mattress  - nursing to monitor skin qShift  - soft heel protectors  - skin barrier cream PRN    Diet/Dysphagia:  - Diet Consistency: Regular  - Aspiration Precautions  - SLP consult for swallow function evaluation and treatment  - Nutrition consult    DVT prophylaxis:   - lovenox--  - SCDs      IDT 7/27  SW: resides with mother, and brother in [facility] 5h/d x 5d/week. Pt has medicare coverage.   OT: Sup. e/ UBD & grooming;  LBD - Min A. Mod A with toilet. Max A shower & bathing; Goal Min & supervision I e/d and Min A transf  PT: Min A transf; Mod A Amb. 40ft HC. 4steps mod A.  Goal Mod I transf and ambulation 150ft with Sup.   Speech: Reg/thins diet; mild high level cognitive deficits, Mild Dysarthria.  Good awareness and safety. Increase in Quantitative measurement.   ELOS: 8/3
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
## 2021-07-29 LAB
ALBUMIN SERPL ELPH-MCNC: 3.7 G/DL — SIGNIFICANT CHANGE UP (ref 3.3–5)
ALP SERPL-CCNC: 87 U/L — SIGNIFICANT CHANGE UP (ref 40–120)
ALT FLD-CCNC: 35 U/L — SIGNIFICANT CHANGE UP (ref 10–45)
ANION GAP SERPL CALC-SCNC: 8 MMOL/L — SIGNIFICANT CHANGE UP (ref 5–17)
AST SERPL-CCNC: 17 U/L — SIGNIFICANT CHANGE UP (ref 10–40)
BASOPHILS # BLD AUTO: 0.05 K/UL — SIGNIFICANT CHANGE UP (ref 0–0.2)
BASOPHILS NFR BLD AUTO: 0.9 % — SIGNIFICANT CHANGE UP (ref 0–2)
BILIRUB SERPL-MCNC: 0.4 MG/DL — SIGNIFICANT CHANGE UP (ref 0.2–1.2)
BUN SERPL-MCNC: 12 MG/DL — SIGNIFICANT CHANGE UP (ref 7–23)
CALCIUM SERPL-MCNC: 9 MG/DL — SIGNIFICANT CHANGE UP (ref 8.4–10.5)
CHLORIDE SERPL-SCNC: 104 MMOL/L — SIGNIFICANT CHANGE UP (ref 96–108)
CO2 SERPL-SCNC: 28 MMOL/L — SIGNIFICANT CHANGE UP (ref 22–31)
CREAT SERPL-MCNC: 1.08 MG/DL — SIGNIFICANT CHANGE UP (ref 0.5–1.3)
EOSINOPHIL # BLD AUTO: 0.14 K/UL — SIGNIFICANT CHANGE UP (ref 0–0.5)
EOSINOPHIL NFR BLD AUTO: 2.6 % — SIGNIFICANT CHANGE UP (ref 0–6)
GLUCOSE SERPL-MCNC: 89 MG/DL — SIGNIFICANT CHANGE UP (ref 70–99)
HCT VFR BLD CALC: 39.4 % — SIGNIFICANT CHANGE UP (ref 39–50)
HGB BLD-MCNC: 13.9 G/DL — SIGNIFICANT CHANGE UP (ref 13–17)
IMM GRANULOCYTES NFR BLD AUTO: 0.2 % — SIGNIFICANT CHANGE UP (ref 0–1.5)
LYMPHOCYTES # BLD AUTO: 2.09 K/UL — SIGNIFICANT CHANGE UP (ref 1–3.3)
LYMPHOCYTES # BLD AUTO: 38.8 % — SIGNIFICANT CHANGE UP (ref 13–44)
MCHC RBC-ENTMCNC: 33.5 PG — SIGNIFICANT CHANGE UP (ref 27–34)
MCHC RBC-ENTMCNC: 35.3 GM/DL — SIGNIFICANT CHANGE UP (ref 32–36)
MCV RBC AUTO: 94.9 FL — SIGNIFICANT CHANGE UP (ref 80–100)
MONOCYTES # BLD AUTO: 0.39 K/UL — SIGNIFICANT CHANGE UP (ref 0–0.9)
MONOCYTES NFR BLD AUTO: 7.2 % — SIGNIFICANT CHANGE UP (ref 2–14)
NEUTROPHILS # BLD AUTO: 2.7 K/UL — SIGNIFICANT CHANGE UP (ref 1.8–7.4)
NEUTROPHILS NFR BLD AUTO: 50.3 % — SIGNIFICANT CHANGE UP (ref 43–77)
NRBC # BLD: 0 /100 WBCS — SIGNIFICANT CHANGE UP (ref 0–0)
PLATELET # BLD AUTO: 227 K/UL — SIGNIFICANT CHANGE UP (ref 150–400)
POTASSIUM SERPL-MCNC: 4.2 MMOL/L — SIGNIFICANT CHANGE UP (ref 3.5–5.3)
POTASSIUM SERPL-SCNC: 4.2 MMOL/L — SIGNIFICANT CHANGE UP (ref 3.5–5.3)
PROT SERPL-MCNC: 7.3 G/DL — SIGNIFICANT CHANGE UP (ref 6–8.3)
RBC # BLD: 4.15 M/UL — LOW (ref 4.2–5.8)
RBC # FLD: 10.6 % — SIGNIFICANT CHANGE UP (ref 10.3–14.5)
SODIUM SERPL-SCNC: 140 MMOL/L — SIGNIFICANT CHANGE UP (ref 135–145)
WBC # BLD: 5.38 K/UL — SIGNIFICANT CHANGE UP (ref 3.8–10.5)
WBC # FLD AUTO: 5.38 K/UL — SIGNIFICANT CHANGE UP (ref 3.8–10.5)

## 2021-07-29 PROCEDURE — 99232 SBSQ HOSP IP/OBS MODERATE 35: CPT

## 2021-07-29 RX ADMIN — Medication 10 MILLIGRAM(S): at 21:35

## 2021-07-29 RX ADMIN — ENOXAPARIN SODIUM 40 MILLIGRAM(S): 100 INJECTION SUBCUTANEOUS at 21:35

## 2021-07-29 RX ADMIN — TIZANIDINE 4 MILLIGRAM(S): 4 TABLET ORAL at 21:35

## 2021-07-29 NOTE — PROGRESS NOTE ADULT - ASSESSMENT
TURNER HERNANDEZ is a 30M with PMH of TBI s/p left frontoparietal craniectomy (3/2020) presented to [facility] on 6/18/21 for scheduled left cranioplasty, with right Hemiparesis, Spasticity, dysmetria, gait and aDL impairment.       L cranioplasty  - hx TBI 3/2020 s/p L craniectomy, s/p PEG (removed), s/p trach (decannulated)-incisions healing well.  - Cont.  comprehensive rehab program of PT/OT/SLP - 3 hours a day, 5 days a week. P&O as needed   --K-taping for right shoulder ordered in OT    Spasticity  - tizanidine 4mg qhs. Tolerating it well   -Botox eval outpt    #HTN  -BP is stable. No meds   -Monitor    Pain  - Tylenol PRN  - Avoid sedating medications that may interfere with cognitive recovery    GI / Bowel  --cont. bisacodyl and Miralax daily  - prune juice      / Bladder  -continent  - PVR-low-    Skin / Pressure injury  - Monitor Incisions:    - Turn q2 hours in bed while awake, air mattress  - nursing to monitor skin qShift  - soft heel protectors  - skin barrier cream PRN    Diet/Dysphagia:  - Diet Consistency: Regular  - Aspiration Precautions  - SLP consult for swallow function evaluation and treatment  - Nutrition consult    DVT prophylaxis:   - lovenox--  - SCDs
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
## 2021-07-30 PROCEDURE — 99232 SBSQ HOSP IP/OBS MODERATE 35: CPT | Mod: GC

## 2021-07-30 RX ADMIN — TIZANIDINE 4 MILLIGRAM(S): 4 TABLET ORAL at 21:42

## 2021-07-30 RX ADMIN — ENOXAPARIN SODIUM 40 MILLIGRAM(S): 100 INJECTION SUBCUTANEOUS at 21:42

## 2021-07-30 RX ADMIN — Medication 10 MILLIGRAM(S): at 21:42

## 2021-07-30 NOTE — PROGRESS NOTE ADULT - ASSESSMENT
TURNER HERNANDEZ is a 30M with PMH of TBI s/p left frontoparietal craniectomy (3/2020) presented to [facility] on 6/18/21 for scheduled left cranioplasty, with right Hemiparesis, Spasticity, dysmetria, gait and aDL impairment.       L cranioplasty  - hx TBI 3/2020 s/p L craniectomy, s/p PEG (removed), s/p trach (decannulated)-incisions healing well.  - Cont.  comprehensive rehab program of PT/OT/SLP - 3 hours a day, 5 days a week. P&O as needed   --K-taping for right shoulder ordered in OT    Spasticity  - tizanidine 4mg qhs. Tolerating it well     #HTN  -BP is stable. No meds   -Monitor    Pain  - Tylenol PRN  - Avoid sedating medications that may interfere with cognitive recovery    GI / Bowel  - Stopped famotidine.  - d/c senna   --cont. bisacodyl daily  - prune juice   - Cont. polyethylene glycol daily      / Bladder  -continent  - PVR-low-    Skin / Pressure injury  - Monitor Incisions:    - Turn q2 hours in bed while awake, air mattress  - nursing to monitor skin qShift  - soft heel protectors  - skin barrier cream PRN    Diet/Dysphagia:  - Diet Consistency: Regular  - Aspiration Precautions  - SLP consult for swallow function evaluation and treatment  - Nutrition consult    DVT prophylaxis:   - lovenox--  - SCDs      Outpatient Follow-up:  Nely Conroy)  [facility] Neurosurgery  General  [address] [place] [address], [address]  [place] [address]  Phone: [phone]  Fax: [phone]  Follow Up Time: Routine  ---------------    IDT 7/20--  SW: resides with mother, and brother in [facility] 5h/d x 5d/week. Pt has medicare coverage.   OT: Sup. e/ UBD & grooming;  LBD - Min A. Mod A with toilet. Total A shower & bathing; Goal Min & supervision I e/d and Min A transf  PT: Min A transf; Mod A Amb. 30ft HC.. tot A 2 steps.  Goal Mod I transf and ambulation 150ft with Sup.   Speech: Reg/thins diet; mild high level cognitive deficits, Mild Dysarthria.  Good awareness and safety. Increase in Quantitative measurement.   ELOS: 8/3
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
## 2021-07-31 PROCEDURE — 99232 SBSQ HOSP IP/OBS MODERATE 35: CPT

## 2021-07-31 RX ADMIN — Medication 10 MILLIGRAM(S): at 22:00

## 2021-07-31 RX ADMIN — ENOXAPARIN SODIUM 40 MILLIGRAM(S): 100 INJECTION SUBCUTANEOUS at 22:00

## 2021-07-31 RX ADMIN — TIZANIDINE 4 MILLIGRAM(S): 4 TABLET ORAL at 22:00
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
## 2021-07-31 NOTE — PROGRESS NOTE ADULT - ASSESSMENT
29 y/o M PMH HTN, TBI s/p left frontoparietal craniectomy (3/2020) presented to [facility] on 6/18/21 for scheduled left frontoparietal cranioplasty, now with functional deficits admitted for inpatient rehab- pt/ot/dvt ppx    #L frontoparietal cranioplasty  -Continue comprehensive acute rehab   -Fall, seizure precautions  -Tizanidine qhs    #HTN  -Normotensive off meds  -Monitor    #DVT ppx  -Lovenox

## 2021-07-31 NOTE — PROGRESS NOTE ADULT - SUBJECTIVE AND OBJECTIVE BOX
Hospitalist: Adwoa Zaman DO    CHIEF COMPLAINT: Patient is a 30y old  male who presents with a chief complaint of s/p cranioplasty (31 Jul 2021 12:20)      SUBJECTIVE / OVERNIGHT EVENTS: Patient seen and examined. No acute events overnight. Pain well controlled and patient without any complaints.    MEDICATIONS  (STANDING):  bisacodyl 10 milliGRAM(s) Oral at bedtime  enoxaparin Injectable 40 milliGRAM(s) SubCutaneous at bedtime  polyethylene glycol 3350 17 Gram(s) Oral daily  tiZANidine 4 milliGRAM(s) Oral at bedtime    MEDICATIONS  (PRN):  acetaminophen   Tablet .. 650 milliGRAM(s) Oral every 6 hours PRN Mild Pain (1 - 3)      VITALS:  T(F): 98.4 (07-31-21 @ 08:08), Max: 98.4 (07-31-21 @ 08:08)  HR: 75 (07-31-21 @ 08:08) (75 - 76)  BP: 105/69 (07-31-21 @ 08:08) (105/69 - 110/73)  RR: 16 (07-31-21 @ 08:08) (16 - 16)  SpO2: 97% (07-31-21 @ 08:08)      REVIEW OF SYSTEMS:  For ROV please refer back to H&P     PHYSICAL EXAM:  GENERAL: NAD, well-developed  EYES: EOMI, PERRLA, conjunctiva and sclera clear  NECK: Supple, No JVD  CHEST/LUNG: Clear to auscultation bilaterally; No wheeze, nonlabored breathing  HEART: Regular rate and rhythm; No murmurs, rubs, or gallops  ABDOMEN: Soft, Nontender, Nondistended; Bowel sounds present  EXTREMITIES:  2+ Peripheral Pulses, No clubbing, cyanosis, or edema  NEUROLOGY: AAOx3, right sided weakness  PSYCH: calm, appropriate mood    RADIOLOGY & ADDITIONAL TESTS:    Imaging Personally Reviewed:    [X] Consultant(s) Notes Reviewed:  [X] Care Discussed with Consultants/Other Providers:

## 2021-07-31 NOTE — PROGRESS NOTE ADULT - ASSESSMENT
TURNER HERNANDEZ is a 30M with PMH of TBI s/p left frontoparietal craniectomy (3/2020) presented to [facility] on 6/18/21 for scheduled left cranioplasty, with right Hemiparesis, Spasticity, dysmetria, gait and aDL impairment.     # hx TBI 3/2020 s/p L craniectomy  -  s/p PEG (removed), s/p trach (decannulated)  - Cont.  comprehensive rehab program of PT/OT/SLP - 3 hours a day, 5 days a week  --K-taping for right shoulder ordered in OT    # Spasticity  - tizanidine 4mg qhs    #HTN  -No meds   -(105/69 - 110/73) 7/31 stable    # Pain  - Tylenol PRN  - Avoid sedating medications that may interfere with cognitive recovery  - K taping shoulder, positioning and support    #GI / Bowel  - Stopped famotidine.  - d/c senna  - continue bisacodyl  and miralax daily, prune juice  - patient reports daily BM 7/31    # Diet  - Diet Consistency: Regular    #DVT prophylaxis:   - lovenox--  - SCDs      Outpatient Follow-up:  Nely Conroy)  [facility] Neurosurgery  General  [address], [address]  [place] [address]  Phone: [phone]  Fax: [phone]  Follow Up Time: Routine  ---------------

## 2021-07-31 NOTE — PROGRESS NOTE ADULT - COMMENTS
Patient voiding, reports some difficulty sleeping but states this has been his pattern even prior. Denies pain or noise or anxiety that is keeping him up. Does not want sleep aid

## 2021-08-01 PROCEDURE — 99232 SBSQ HOSP IP/OBS MODERATE 35: CPT

## 2021-08-01 RX ORDER — LIDOCAINE 4 G/100G
1 CREAM TOPICAL DAILY
Refills: 0 | Status: DISCONTINUED | OUTPATIENT
Start: 2021-08-01 | End: 2021-08-03

## 2021-08-01 RX ADMIN — LIDOCAINE 1 PATCH: 4 CREAM TOPICAL at 21:31

## 2021-08-01 RX ADMIN — TIZANIDINE 4 MILLIGRAM(S): 4 TABLET ORAL at 21:27

## 2021-08-01 RX ADMIN — Medication 10 MILLIGRAM(S): at 21:27

## 2021-08-01 RX ADMIN — ENOXAPARIN SODIUM 40 MILLIGRAM(S): 100 INJECTION SUBCUTANEOUS at 21:27

## 2021-08-01 NOTE — PROGRESS NOTE ADULT - SUBJECTIVE AND OBJECTIVE BOX
Hospitalist: Adwoa Zaman DO    CHIEF COMPLAINT: Patient is a 30y old  male who presents with a chief complaint of s/p cranioplasty (01 Aug 2021 11:26)      SUBJECTIVE / OVERNIGHT EVENTS: Patient seen and examined. No acute events overnight. Pain well controlled and patient without any complaints.    MEDICATIONS  (STANDING):  bisacodyl 10 milliGRAM(s) Oral at bedtime  enoxaparin Injectable 40 milliGRAM(s) SubCutaneous at bedtime  polyethylene glycol 3350 17 Gram(s) Oral daily  tiZANidine 4 milliGRAM(s) Oral at bedtime    MEDICATIONS  (PRN):  acetaminophen   Tablet .. 650 milliGRAM(s) Oral every 6 hours PRN Mild Pain (1 - 3)  lidocaine   4% Patch 1 Patch Transdermal daily PRN pain      VITALS:  T(F): 97.9 (08-01-21 @ 08:22), Max: 98.3 (07-31-21 @ 19:53)  HR: 69 (08-01-21 @ 08:22) (69 - 69)  BP: 109/73 (08-01-21 @ 08:22) (109/73 - 115/74)  RR: 16 (08-01-21 @ 08:22) (16 - 16)  SpO2: 99% (08-01-21 @ 08:22)    Weight (kg): 96 (23:15)    REVIEW OF SYSTEMS:  For ROV please refer back to H&P     PHYSICAL EXAM:  GENERAL: NAD, well-developed  EYES: EOMI, PERRLA, conjunctiva and sclera clear  NECK: Supple, No JVD  CHEST/LUNG: Clear to auscultation bilaterally; No wheeze, nonlabored breathing  HEART: Regular rate and rhythm; No murmurs, rubs, or gallops  ABDOMEN: Soft, Nontender, Nondistended; Bowel sounds present  EXTREMITIES:  2+ Peripheral Pulses, No clubbing, cyanosis, or edema  NEUROLOGY: AAOx3, right sided weakness  PSYCH: calm, appropriate mood      MICROBIOLOGY:          RADIOLOGY & ADDITIONAL TESTS:    Imaging Personally Reviewed:    [X] Consultant(s) Notes Reviewed:  [X] Care Discussed with Consultants/Other Providers:
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
## 2021-08-01 NOTE — PROGRESS NOTE ADULT - ASSESSMENT
31 y/o M PMH HTN, TBI s/p left frontoparietal craniectomy (3/2020) presented to [facility] on 6/18/21 for scheduled left frontoparietal cranioplasty, now with functional deficits admitted for inpatient rehab- pt/ot/dvt ppx    #L frontoparietal cranioplasty  -Continue comprehensive acute rehab   -Fall, seizure precautions  -Tizanidine qhs    #HTN  -Normotensive off meds  -Monitor    #DVT ppx  -Lovenox

## 2021-08-01 NOTE — PROGRESS NOTE ADULT - ASSESSMENT
TURNER HERNANDEZ is a 30M with PMH of TBI s/p left frontoparietal craniectomy (3/2020) presented to [facility] on 6/18/21 for scheduled left cranioplasty, with right Hemiparesis, Spasticity, dysmetria, gait and aDL impairment.     # hx TBI 3/2020 s/p L craniectomy  -  s/p PEG (removed), s/p trach (decannulated)  - Cont.  comprehensive rehab program of PT/OT/SLP - 3 hours a day, 5 days a week  --K-taping for right shoulder ordered in OT    # Spasticity  - tizanidine 4mg qhs  - right sided muscle tightness/spasm: add warm compress PRN, lidoderm patch 8/1    #HTN  -No meds   - (109/73 - 115/74) controlled 8/1    # Pain  - Tylenol PRN  - Avoid sedating medications that may interfere with cognitive recovery  - K taping shoulder, positioning and support  - warm compress, lidoderm patch right neck    #GI / Bowel  - continue bisacodyl  and miralax daily, prune juice    # Diet  -  Regular    #DVT prophylaxis:   - lovenox--  - SCDs      Outpatient Follow-up:  Nely Conroy)  [facility] Neurosurgery  General  [address], [address]  [place] [address]  Phone: [phone]  Fax: [phone]  Follow Up Time: Routine  ---------------

## 2021-08-02 ENCOUNTER — TRANSCRIPTION ENCOUNTER (OUTPATIENT)
Age: 31
End: 2021-08-02

## 2021-08-02 LAB
ALBUMIN SERPL ELPH-MCNC: 3.6 G/DL — SIGNIFICANT CHANGE UP (ref 3.3–5)
ALP SERPL-CCNC: 81 U/L — SIGNIFICANT CHANGE UP (ref 40–120)
ALT FLD-CCNC: 41 U/L — SIGNIFICANT CHANGE UP (ref 10–45)
ANION GAP SERPL CALC-SCNC: 5 MMOL/L — SIGNIFICANT CHANGE UP (ref 5–17)
AST SERPL-CCNC: 18 U/L — SIGNIFICANT CHANGE UP (ref 10–40)
BASOPHILS # BLD AUTO: 0.05 K/UL — SIGNIFICANT CHANGE UP (ref 0–0.2)
BASOPHILS NFR BLD AUTO: 1 % — SIGNIFICANT CHANGE UP (ref 0–2)
BILIRUB SERPL-MCNC: 0.5 MG/DL — SIGNIFICANT CHANGE UP (ref 0.2–1.2)
BUN SERPL-MCNC: 10 MG/DL — SIGNIFICANT CHANGE UP (ref 7–23)
CALCIUM SERPL-MCNC: 8.9 MG/DL — SIGNIFICANT CHANGE UP (ref 8.4–10.5)
CHLORIDE SERPL-SCNC: 104 MMOL/L — SIGNIFICANT CHANGE UP (ref 96–108)
CO2 SERPL-SCNC: 30 MMOL/L — SIGNIFICANT CHANGE UP (ref 22–31)
CREAT SERPL-MCNC: 0.99 MG/DL — SIGNIFICANT CHANGE UP (ref 0.5–1.3)
EOSINOPHIL # BLD AUTO: 0.17 K/UL — SIGNIFICANT CHANGE UP (ref 0–0.5)
EOSINOPHIL NFR BLD AUTO: 3.3 % — SIGNIFICANT CHANGE UP (ref 0–6)
GLUCOSE SERPL-MCNC: 91 MG/DL — SIGNIFICANT CHANGE UP (ref 70–99)
HCT VFR BLD CALC: 39.4 % — SIGNIFICANT CHANGE UP (ref 39–50)
HGB BLD-MCNC: 13.9 G/DL — SIGNIFICANT CHANGE UP (ref 13–17)
IMM GRANULOCYTES NFR BLD AUTO: 0.2 % — SIGNIFICANT CHANGE UP (ref 0–1.5)
LYMPHOCYTES # BLD AUTO: 2.18 K/UL — SIGNIFICANT CHANGE UP (ref 1–3.3)
LYMPHOCYTES # BLD AUTO: 42.2 % — SIGNIFICANT CHANGE UP (ref 13–44)
MCHC RBC-ENTMCNC: 33.5 PG — SIGNIFICANT CHANGE UP (ref 27–34)
MCHC RBC-ENTMCNC: 35.3 GM/DL — SIGNIFICANT CHANGE UP (ref 32–36)
MCV RBC AUTO: 94.9 FL — SIGNIFICANT CHANGE UP (ref 80–100)
MONOCYTES # BLD AUTO: 0.41 K/UL — SIGNIFICANT CHANGE UP (ref 0–0.9)
MONOCYTES NFR BLD AUTO: 7.9 % — SIGNIFICANT CHANGE UP (ref 2–14)
NEUTROPHILS # BLD AUTO: 2.35 K/UL — SIGNIFICANT CHANGE UP (ref 1.8–7.4)
NEUTROPHILS NFR BLD AUTO: 45.4 % — SIGNIFICANT CHANGE UP (ref 43–77)
NRBC # BLD: 0 /100 WBCS — SIGNIFICANT CHANGE UP (ref 0–0)
PLATELET # BLD AUTO: 228 K/UL — SIGNIFICANT CHANGE UP (ref 150–400)
POTASSIUM SERPL-MCNC: 4.2 MMOL/L — SIGNIFICANT CHANGE UP (ref 3.5–5.3)
POTASSIUM SERPL-SCNC: 4.2 MMOL/L — SIGNIFICANT CHANGE UP (ref 3.5–5.3)
PROT SERPL-MCNC: 7.4 G/DL — SIGNIFICANT CHANGE UP (ref 6–8.3)
RBC # BLD: 4.15 M/UL — LOW (ref 4.2–5.8)
RBC # FLD: 10.8 % — SIGNIFICANT CHANGE UP (ref 10.3–14.5)
SODIUM SERPL-SCNC: 139 MMOL/L — SIGNIFICANT CHANGE UP (ref 135–145)
WBC # BLD: 5.17 K/UL — SIGNIFICANT CHANGE UP (ref 3.8–10.5)
WBC # FLD AUTO: 5.17 K/UL — SIGNIFICANT CHANGE UP (ref 3.8–10.5)

## 2021-08-02 PROCEDURE — 99232 SBSQ HOSP IP/OBS MODERATE 35: CPT

## 2021-08-02 RX ADMIN — Medication 650 MILLIGRAM(S): at 20:23

## 2021-08-02 RX ADMIN — ENOXAPARIN SODIUM 40 MILLIGRAM(S): 100 INJECTION SUBCUTANEOUS at 21:32

## 2021-08-02 RX ADMIN — Medication 650 MILLIGRAM(S): at 19:23

## 2021-08-02 RX ADMIN — Medication 10 MILLIGRAM(S): at 21:32

## 2021-08-02 RX ADMIN — LIDOCAINE 1 PATCH: 4 CREAM TOPICAL at 21:31

## 2021-08-02 RX ADMIN — TIZANIDINE 4 MILLIGRAM(S): 4 TABLET ORAL at 21:32

## 2021-08-02 RX ADMIN — LIDOCAINE 1 PATCH: 4 CREAM TOPICAL at 06:48

## 2021-08-02 RX ADMIN — LIDOCAINE 1 PATCH: 4 CREAM TOPICAL at 07:40

## 2021-08-02 NOTE — PROGRESS NOTE ADULT - ASSESSMENT
TURNER HERNANDEZ is a 30M with PMH of TBI s/p left frontoparietal craniectomy (3/2020) presented to [facility] on 6/18/21 for scheduled left cranioplasty, with right Hemiparesis, Spasticity, dysmetria, gait and aDL impairment.     # hx TBI 3/2020 s/p L craniectomy  -  s/p PEG (removed), s/p trach (decannulated)  - Cont.  comprehensive rehab program of PT/OT/SLP - 3 hours a day, 5 days a week  --K-taping for right shoulder ordered in OT    # Spasticity  - tizanidine 4mg qhs  - right sided muscle tightness/spasm: add warm compress PRN & Lidoderm patch.    #HTN  -No meds     # Pain  - Tylenol PRN  - Avoid sedating medications that may interfere with cognitive recovery  - K taping shoulder, positioning and support  - warm compress, Lidoderm patch right neck    #GI / Bowel  - continue bisacodyl  and miralax daily, prune juice    # Diet  -  Regular    #DVT prophylaxis:   - lovenox--  - SCDs      Outpatient Follow-up:  Nely Conroy)  [facility] Neurosurgery  General  [address] [facility] [address], [address]  [place] [address]  Phone: [phone]  Fax: [phone]  Follow Up Time: Routine  ---------------   TURNER HERNANDEZ is a 30M with PMH of TBI s/p left frontoparietal craniectomy (3/2020) presented to [facility] on 6/18/21 for scheduled left cranioplasty, with right Hemiparesis, Spasticity, dysmetria, gait and aDL impairment.     # hx TBI 3/2020 s/p L craniectomy  -  s/p PEG (removed), s/p trach (decannulated)  - Cont.  comprehensive rehab program of PT/OT/SLP - 3 hours a day, 5 days a week  --K-taping for right shoulder ordered in OT    # Spasticity  - tizanidine 4mg qhs  - right sided muscle tightness/spasm: add warm compress PRN & Lidoderm patch.    #HTN  -No meds     # Pain  - Tylenol PRN  - Avoid sedating medications that may interfere with cognitive recovery  - K taping shoulder, positioning and support  - warm compress, Lidoderm patch right neck    #GI / Bowel  - continue bisacodyl  and miralax daily, prune juice    # Diet  -  Currently on Soft with nectar fluids.     #DVT prophylaxis:   - lovenox--  - SCDs      Outpatient Follow-up:  Nely Conroy)  [facility] Neurosurgery  General  [address] [facility], [address]  [place] [address]  Phone: [phone]  Fax: [phone]  Follow Up Time: Routine  ---------------   TURNER HERNANDEZ is a 30M with PMH of TBI s/p left frontoparietal craniectomy (3/2020) presented to [facility] on 6/18/21 for scheduled left cranioplasty, with right Hemiparesis, Spasticity, dysmetria, gait and aDL impairment.     # hx TBI 3/2020 s/p L craniectomy  -  s/p PEG (removed), s/p trach (decannulated)  - Cont.  comprehensive rehab program of PT/OT/SLP - 3 hours a day, 5 days a week  --K-taping for right shoulder ordered in OT    # Spasticity  - tizanidine 4mg qhs  - right sided muscle tightness/spasm: add warm compress PRN & Lidoderm patch.    #HTN  -No meds     # Pain  - Tylenol PRN  - Avoid sedating medications that may interfere with cognitive recovery  - K taping shoulder, positioning and support  - warm compress, Lidoderm patch right neck    #GI / Bowel  - continue bisacodyl  and miralax daily, prune juice    # Diet  -  Regular Diet    #DVT prophylaxis:   - lovenox--  - SCDs      Outpatient Follow-up:  Nely Conroy)  [facility] Neurosurgery  General  [address], [address]  [place] [address]  Phone: [phone]  Fax: [phone]  Follow Up Time: Routine  ---------------

## 2021-08-02 NOTE — DISCHARGE NOTE NURSING/CASE MANAGEMENT/SOCIAL WORK - PATIENT PORTAL LINK FT
You can access the FollowMyHealth Patient Portal offered by [facility] by registering at the following website: [email or web address]. By joining [employer]’s FollowMyHealth portal, you will also be able to view your health information using other applications (apps) compatible with our system.
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
## 2021-08-02 NOTE — DISCHARGE NOTE NURSING/CASE MANAGEMENT/SOCIAL WORK - NSDCFUADDAPPT_GEN_ALL_CORE_FT
The following was scheduled:  [facility]  Address: [address] [place] [address]  Phone:[phone]  Appointment Date: 8/4/21  Appointment Time: 10AM for physical therapy 11am for occupational therapy  *Please bring insurance cards, referrals and photo id to the appointment    Please follow up with PCP in 2 weeks   The following was scheduled:  [facility]  Address: [address] [place] [address]  Phone:[phone]  Appointment Date: 8/4/21  Appointment Time: 10AM for physical therapy 11am for occupational therapy  *Please bring insurance cards, referrals and photo id to the appointment    Speech Therapy scheduled at [facility]  Date: 9/3/21  Time:11am  Address: [address] [place] [address] (third floor)  Phone: [phone]  *Arrive 20 minutes early with insurance card and photo ID.     Primary Care Doctor follow up with Dr. Chanda Juan on 8//21 at    The following was scheduled:  [facility]  Address: [address] [place] [address]  Phone:[phone]  Appointment Date: 8/4/21  Appointment Time: 10AM for physical therapy 11am for occupational therapy  *Please bring insurance cards, referrals and photo id to the appointment    Speech Therapy scheduled at [facility]  Date: 9/3/21  Time:11am  Address: [address] [place] [address] (third floor)  Phone: [phone]  *Arrive 20 minutes early with insurance card and photo ID.      Schedule Primary Care Doctor follow up with Chanda Juan NP in 2 weeks

## 2021-08-03 VITALS
RESPIRATION RATE: 15 BRPM | HEART RATE: 70 BPM | OXYGEN SATURATION: 99 % | SYSTOLIC BLOOD PRESSURE: 116 MMHG | TEMPERATURE: 98 F | DIASTOLIC BLOOD PRESSURE: 80 MMHG

## 2021-08-03 PROCEDURE — 86769 SARS-COV-2 COVID-19 ANTIBODY: CPT

## 2021-08-03 PROCEDURE — U0005: CPT

## 2021-08-03 PROCEDURE — 85025 COMPLETE CBC W/AUTO DIFF WBC: CPT

## 2021-08-03 PROCEDURE — 92523 SPEECH SOUND LANG COMPREHEN: CPT

## 2021-08-03 PROCEDURE — 97530 THERAPEUTIC ACTIVITIES: CPT

## 2021-08-03 PROCEDURE — 80053 COMPREHEN METABOLIC PANEL: CPT

## 2021-08-03 PROCEDURE — 97167 OT EVAL HIGH COMPLEX 60 MIN: CPT

## 2021-08-03 PROCEDURE — 36415 COLL VENOUS BLD VENIPUNCTURE: CPT

## 2021-08-03 PROCEDURE — 99238 HOSP IP/OBS DSCHRG MGMT 30/<: CPT

## 2021-08-03 PROCEDURE — 97116 GAIT TRAINING THERAPY: CPT

## 2021-08-03 PROCEDURE — 92507 TX SP LANG VOICE COMM INDIV: CPT

## 2021-08-03 PROCEDURE — 97112 NEUROMUSCULAR REEDUCATION: CPT

## 2021-08-03 PROCEDURE — 97163 PT EVAL HIGH COMPLEX 45 MIN: CPT

## 2021-08-03 PROCEDURE — 92610 EVALUATE SWALLOWING FUNCTION: CPT

## 2021-08-03 PROCEDURE — 97760 ORTHOTIC MGMT&TRAING 1ST ENC: CPT

## 2021-08-03 PROCEDURE — U0003: CPT

## 2021-08-03 PROCEDURE — 97110 THERAPEUTIC EXERCISES: CPT

## 2021-08-03 PROCEDURE — 97535 SELF CARE MNGMENT TRAINING: CPT

## 2021-08-03 RX ORDER — TIZANIDINE 4 MG/1
1 TABLET ORAL
Qty: 30 | Refills: 0
Start: 2021-08-03 | End: 2021-09-01

## 2021-08-03 RX ORDER — POLYETHYLENE GLYCOL 3350 17 G/17G
17 POWDER, FOR SOLUTION ORAL
Qty: 0 | Refills: 0 | DISCHARGE
Start: 2021-08-03

## 2021-08-03 RX ORDER — ACETAMINOPHEN 500 MG
2 TABLET ORAL
Qty: 0 | Refills: 0 | DISCHARGE

## 2021-08-03 RX ORDER — ACETAMINOPHEN 500 MG
2 TABLET ORAL
Qty: 0 | Refills: 0 | DISCHARGE
Start: 2021-08-03

## 2021-08-03 RX ADMIN — LIDOCAINE 1 PATCH: 4 CREAM TOPICAL at 11:15

## 2021-08-03 NOTE — PROGRESS NOTE ADULT - ATTENDING COMMENTS
Doing well, no acute issues, continue therapy
Pt. seen with resident.  Agree with documentation above as per resident with amendments made as appropriate. Patient medically stable. Making progress towards rehab goals.     TBI   --stable.
Patient is being discharged home with home care  today.  Discharge instructions were discussed with patient,  all current medications were sent to the pharmacy. Patient was educated on importance of medication compliance,  continued  care with PMD and follow-up care with the specialists in the community. Safety and fall risk precautions  were discussed in detail, counseled on healthy life style modifications.  All questions were answered to their satisfaction.
Pt is stable to continue rehab
Pt. seen with NP.  Agree with documentation above as per NP with amendments made as appropriate. Patient medically stable. Making progress towards rehab goals.     TBI s/p cranioplasty  stable
Pt. seen with resident.  Agree with documentation above as per resident with amendments made as appropriate. Patient medically stable. Making progress towards rehab goals.     TBI  Constipation-- added miralax,  educated on importance of adequate hydration
Pt. seen with resident.  Agree with documentation above as per resident with amendments made as appropriate. Patient medically stable. Making progress towards rehab goals.     TBI  Making progress.  Provided update to patient on team meeting discussion and extension of LOS
Pt. seen with resident.  Agree with documentation above as per resident with amendments made as appropriate. Patient medically stable. Making progress towards rehab goals.     TBI   Stable.
Pt. seen with resident.  Agree with documentation above as per resident with amendments made as appropriate. Patient medically stable. Making progress towards rehab goals.     TBI  stable,  d/c planning
Pt. seen with resident.  Agree with documentation above as per resident with amendments made as appropriate. Patient medically stable. Making progress towards rehab goals.     TBI s/p cranioplasty  --FT today with sister
Pt. seen with resident.  Agree with documentation above as per resident with amendments made as appropriate. Patient medically stable. Making progress towards rehab goals.     TBI--stable--  FT on 7/26
Pt. seen with resident.  Agree with documentation above as per resident with amendments made as appropriate. Patient medically stable. Making progress towards rehab goals.     TBI  FT on 7/19  Seen during OT--OT notes pt's tone in right UE limiting him-- will benefit from increase in zanaflex to 4mg.  d/w patient.
Pt. seen with resident.  Agree with documentation above as per resident with amendments made as appropriate. Patient medically stable. Making progress towards rehab goals.     TBI with right HP  change bisacodyl to qhs dosing as per pt. request  cont. Tizanidine-- tone slightly improved in PT-- adjust dosing next week prn
Pt. seen with resident.  Agree with documentation above as per resident with amendments made as appropriate. Patient medically stable. Making progress towards rehab goals.     TBI--s/p Left cranioplasty  Pt. reports that he feels he is getting better and able to move his leg better when transferring.   Nursing noted pt. seemed be helping less this week than earlier in admission.  VSS.  No decline in therapy.  Monitor

## 2021-08-03 NOTE — PROGRESS NOTE ADULT - PROVIDER SPECIALTY LIST ADULT
Hospitalist
Physiatry
Physiatry
Rehab Medicine
Hospitalist
Neurology
Rehab Medicine
Hospitalist
Neurology
Physiatry
Physiatry
Rehab Medicine
Hospitalist
Physiatry
Rehab Medicine
Hospitalist
Physiatry
Rehab Medicine
Rehab Medicine

## 2021-08-03 NOTE — PROGRESS NOTE ADULT - NSICDXPILOT_GEN_ALL_CORE
[place]

## 2021-08-03 NOTE — PROGRESS NOTE ADULT - REASON FOR ADMISSION
s/p cranioplasty

## 2021-08-03 NOTE — PROGRESS NOTE ADULT - ATTENDING SUPERVISION STATEMENT
Resident
ACP
Resident
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
## 2021-08-03 NOTE — PROGRESS NOTE ADULT - ASSESSMENT
TURNER HERNANDEZ is a 30M with PMH of TBI s/p left frontoparietal craniectomy (3/2020) presented to [facility] on 6/18/21 for scheduled left cranioplasty, with right Hemiparesis, Spasticity, dysmetria, gait and aDL impairment.     # hx TBI 3/2020 s/p L craniectomy  -  s/p PEG (removed), s/p trach (decannulated)  - Cont.  comprehensive rehab program of PT/OT/SLP - 3 hours a day, 5 days a week  --K-taping for right shoulder ordered in OT  - pt is excited to go home today.     # Spasticity  - tizanidine 4mg qhs  - right sided muscle tightness/spasm: add warm compress PRN & Lidoderm patch.    #HTN  -No meds     # Pain  - Tylenol PRN  - Avoid sedating medications that may interfere with cognitive recovery  - K taping shoulder, positioning and support  - warm compress, Lidoderm patch right neck    #GI / Bowel  - continue bisacodyl  and miralax daily, prune juice    # Diet  -  Regular Diet    #DVT prophylaxis:   - lovenox--  - SCDs      Outpatient Follow-up:  Nely Conroy)  [facility] Neurosurgery  General  [address], [address]  [place] [address]  Phone: [phone]  Fax: [phone]  Follow Up Time: Routine  ---------------

## 2021-08-16 ENCOUNTER — APPOINTMENT (OUTPATIENT)
Dept: NEUROSURGERY | Facility: CLINIC | Age: 31
End: 2021-08-16
Payer: MEDICAID

## 2021-08-16 VITALS
SYSTOLIC BLOOD PRESSURE: 125 MMHG | BODY MASS INDEX: 29.62 KG/M2 | HEIGHT: 69 IN | TEMPERATURE: 98.1 F | DIASTOLIC BLOOD PRESSURE: 81 MMHG | WEIGHT: 200 LBS | HEART RATE: 70 BPM | OXYGEN SATURATION: 98 %

## 2021-08-16 DIAGNOSIS — Z98.890 OTHER SPECIFIED POSTPROCEDURAL STATES: ICD-10-CM

## 2021-08-16 PROCEDURE — 99024 POSTOP FOLLOW-UP VISIT: CPT

## 2021-08-18 ENCOUNTER — TRANSCRIPTION ENCOUNTER (OUTPATIENT)
Age: 31
End: 2021-08-18

## 2021-08-23 NOTE — PHYSICAL EXAM
[General Appearance - Alert] : alert [General Appearance - In No Acute Distress] : in no acute distress [Oriented To Time, Place, And Person] : oriented to person, place, and time [Cranial Nerves Optic (II)] : visual acuity intact bilaterally,  pupils equal round and reactive to light [Cranial Nerves Oculomotor (III)] : extraocular motion intact [Cranial Nerves Trigeminal (V)] : facial sensation intact symmetrically [Cranial Nerves Facial (VII)] : face symmetrical [Cranial Nerves Vestibulocochlear (VIII)] : hearing was intact bilaterally [Cranial Nerves Accessory (XI - Cranial And Spinal)] : head turning and shoulder shrug symmetric [Cranial Nerves Hypoglossal (XII)] : there was no tongue deviation with protrusion [Sensation Tactile Decrease] : light touch was intact [General Appearance - Well Nourished] : well nourished [General Appearance - Well Developed] : well developed [Dry] : dry [Clean] : clean [Well-Healed] : well-healed [Intact] : intact [No Drainage] : without drainage [Normal Skin] : normal [Normal Skin Turgor] : skin turgor was normal [Impaired Insight] : insight and judgment were intact [Affect] : the affect was normal [Mood] : the mood was normal [Person] : oriented to person [Place] : oriented to place [Time] : oriented to time [Erythema] : not erythematous [Tender] : not tender [Warm] : not warm [Indurated] : not indurated [FreeTextEntry5] : RUE/RLE 5/5, moving LUE/LLE, spastic
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
## 2021-08-24 ENCOUNTER — OUTPATIENT (OUTPATIENT)
Dept: OUTPATIENT SERVICES | Facility: HOSPITAL | Age: 31
LOS: 1 days | End: 2021-08-24
Payer: MEDICAID

## 2021-08-24 ENCOUNTER — APPOINTMENT (OUTPATIENT)
Dept: CT IMAGING | Facility: CLINIC | Age: 31
End: 2021-08-24
Payer: MEDICAID

## 2021-08-24 DIAGNOSIS — Z98.890 OTHER SPECIFIED POSTPROCEDURAL STATES: Chronic | ICD-10-CM

## 2021-08-24 DIAGNOSIS — Z98.890 OTHER SPECIFIED POSTPROCEDURAL STATES: ICD-10-CM

## 2021-08-24 DIAGNOSIS — S06.9X9A UNSPECIFIED INTRACRANIAL INJURY WITH LOSS OF CONSCIOUSNESS OF UNSPECIFIED DURATION, INITIAL ENCOUNTER: Chronic | ICD-10-CM

## 2021-08-24 DIAGNOSIS — Z00.8 ENCOUNTER FOR OTHER GENERAL EXAMINATION: ICD-10-CM

## 2021-08-24 DIAGNOSIS — Z93.1 GASTROSTOMY STATUS: Chronic | ICD-10-CM

## 2021-08-24 PROCEDURE — 70450 CT HEAD/BRAIN W/O DYE: CPT | Mod: 26

## 2021-08-24 PROCEDURE — 70450 CT HEAD/BRAIN W/O DYE: CPT

## 2021-08-26 ENCOUNTER — APPOINTMENT (OUTPATIENT)
Dept: PHYSICAL MEDICINE AND REHAB | Facility: CLINIC | Age: 31
End: 2021-08-26
Payer: MEDICAID

## 2021-08-26 VITALS
WEIGHT: 200 LBS | HEART RATE: 73 BPM | OXYGEN SATURATION: 98 % | DIASTOLIC BLOOD PRESSURE: 75 MMHG | HEIGHT: 69 IN | BODY MASS INDEX: 29.62 KG/M2 | TEMPERATURE: 99.1 F | RESPIRATION RATE: 16 BRPM | SYSTOLIC BLOOD PRESSURE: 114 MMHG

## 2021-08-26 PROCEDURE — 64642 CHEMODENERV 1 EXTREMITY 1-4: CPT

## 2021-08-26 PROCEDURE — 95874 GUIDE NERV DESTR NEEDLE EMG: CPT

## 2021-08-26 PROCEDURE — 64643 CHEMODENERV 1 EXTREM 1-4 EA: CPT

## 2021-09-16 ENCOUNTER — NON-APPOINTMENT (OUTPATIENT)
Age: 31
End: 2021-09-16

## 2021-09-16 ENCOUNTER — APPOINTMENT (OUTPATIENT)
Dept: PHYSICAL MEDICINE AND REHAB | Facility: CLINIC | Age: 31
End: 2021-09-16
Payer: MEDICAID

## 2021-09-16 VITALS
HEART RATE: 69 BPM | SYSTOLIC BLOOD PRESSURE: 115 MMHG | TEMPERATURE: 96.5 F | OXYGEN SATURATION: 97 % | DIASTOLIC BLOOD PRESSURE: 77 MMHG

## 2021-09-16 PROCEDURE — 99213 OFFICE O/P EST LOW 20 MIN: CPT

## 2021-09-17 NOTE — PHYSICAL EXAM
[FreeTextEntry1] : Constitutional: Alert, awake, no acute distress\par HEENT: EOMI, NC/AT, neck supple\par Cardiovascular: All extremities warm and well perfused\par Pulmonary: No respiratory distress, normal chest wall expansion\par Gastrointestinal: No abdominal distention\par Extr: no edema\par \par Neuro:\par AAOx2-3\par Recall 2/3 spontaneously, 3/3 \par Attention--able to complete days of week backward, \par Executive function--able to complete Trails [place] \par \par CN: no nystagmus, visual fields intact, EOMI, right facial weakness, sensation slightly impaired, shoulder nick impaired, tongue midline\par Motor-- right shoulder 3-/5 posterior deltoid, elbow flexion- 3/5, extension 2/5, wrist 2/5, Finger flexion 3/5 ; right LE hip 2/5, knee extensors 5/5, flexors 2/5, PF/DF 0/5. \par Left UE and LE 5/5\par Sens impaired on right\par Coordination impaired on right, \par Tone: right elbow flexors +1/4, ext 2/4, wrist 0/4, fingers--PIP 2/4, Quads-- 1+, PF 3/4\par \par Gait-- ambulates with Quad cane on left-- needs assistance of mother to advance right LE-and balance. Ambulates with Min A. leans to right \par \par No pain with PROM of right shoulder\par

## 2021-09-17 NOTE — ASSESSMENT
[FreeTextEntry1] : increase botox next visit to 400 units-- Add Triceps and increase dosing to PFs.  \par \par Will have office staff help pt. resolve insurance issues and schedule appts for PT, OT and Speech in [facility]\par \par All questions answered.\par \par f/u in 2 months for botox
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
## 2021-09-23 ENCOUNTER — APPOINTMENT (OUTPATIENT)
Dept: PHYSICAL MEDICINE AND REHAB | Facility: CLINIC | Age: 31
End: 2021-09-23

## 2021-12-01 ENCOUNTER — APPOINTMENT (OUTPATIENT)
Dept: PHYSICAL MEDICINE AND REHAB | Facility: CLINIC | Age: 31
End: 2021-12-01
Payer: MEDICAID

## 2021-12-01 VITALS
OXYGEN SATURATION: 97 % | DIASTOLIC BLOOD PRESSURE: 76 MMHG | HEART RATE: 81 BPM | SYSTOLIC BLOOD PRESSURE: 118 MMHG | RESPIRATION RATE: 16 BRPM | TEMPERATURE: 98.6 F | HEIGHT: 69 IN

## 2021-12-01 PROCEDURE — 99072 ADDL SUPL MATRL&STAF TM PHE: CPT

## 2021-12-01 PROCEDURE — 64644 CHEMODENERV 1 EXTREM 5/> MUS: CPT

## 2021-12-01 PROCEDURE — 95874 GUIDE NERV DESTR NEEDLE EMG: CPT

## 2021-12-03 RX ORDER — ONABOTULINUMTOXINA 100 [USP'U]/1
100 INJECTION, POWDER, LYOPHILIZED, FOR SOLUTION INTRADERMAL; INTRAMUSCULAR
Qty: 4 | Refills: 0 | Status: ACTIVE | OUTPATIENT
Start: 2021-12-03

## 2021-12-10 RX ORDER — ACETAMINOPHEN 325 MG/1
325 TABLET ORAL
Qty: 60 | Refills: 0 | Status: ACTIVE | COMMUNITY
Start: 2021-12-10 | End: 1900-01-01

## 2021-12-29 ENCOUNTER — APPOINTMENT (OUTPATIENT)
Dept: PHYSICAL MEDICINE AND REHAB | Facility: CLINIC | Age: 31
End: 2021-12-29
Payer: MEDICAID

## 2021-12-29 VITALS
HEIGHT: 69 IN | OXYGEN SATURATION: 97 % | RESPIRATION RATE: 16 BRPM | DIASTOLIC BLOOD PRESSURE: 65 MMHG | HEART RATE: 76 BPM | BODY MASS INDEX: 29.62 KG/M2 | WEIGHT: 200 LBS | SYSTOLIC BLOOD PRESSURE: 102 MMHG | TEMPERATURE: 98.6 F

## 2021-12-29 DIAGNOSIS — R51.9 HEADACHE, UNSPECIFIED: ICD-10-CM

## 2021-12-29 PROCEDURE — 99215 OFFICE O/P EST HI 40 MIN: CPT

## 2021-12-29 PROCEDURE — 99072 ADDL SUPL MATRL&STAF TM PHE: CPT

## 2021-12-29 RX ORDER — ACETAMINOPHEN 325 MG/1
325 TABLET, FILM COATED ORAL
Qty: 60 | Refills: 3 | Status: ACTIVE | COMMUNITY
Start: 2021-12-29 | End: 1900-01-01

## 2021-12-29 RX ORDER — POLYETHYLENE GLYCOL 3350 17 G/17G
17 POWDER, FOR SOLUTION ORAL DAILY
Qty: 30 | Refills: 3 | Status: ACTIVE | COMMUNITY
Start: 2021-12-29 | End: 1900-01-01

## 2022-01-06 ENCOUNTER — APPOINTMENT (OUTPATIENT)
Dept: INTERNAL MEDICINE | Facility: CLINIC | Age: 32
End: 2022-01-06

## 2022-01-12 ENCOUNTER — APPOINTMENT (OUTPATIENT)
Dept: INTERNAL MEDICINE | Facility: CLINIC | Age: 32
End: 2022-01-12
Payer: MEDICAID

## 2022-01-12 VITALS
BODY MASS INDEX: 29.62 KG/M2 | HEIGHT: 69 IN | DIASTOLIC BLOOD PRESSURE: 68 MMHG | OXYGEN SATURATION: 97 % | TEMPERATURE: 97.2 F | HEART RATE: 68 BPM | RESPIRATION RATE: 16 BRPM | WEIGHT: 200 LBS | SYSTOLIC BLOOD PRESSURE: 110 MMHG

## 2022-01-12 DIAGNOSIS — K59.00 CONSTIPATION, UNSPECIFIED: ICD-10-CM

## 2022-01-12 PROCEDURE — 99204 OFFICE O/P NEW MOD 45 MIN: CPT

## 2022-01-12 PROCEDURE — 99072 ADDL SUPL MATRL&STAF TM PHE: CPT

## 2022-01-12 RX ORDER — BACLOFEN 10 MG/1
10 TABLET ORAL
Qty: 60 | Refills: 0 | Status: DISCONTINUED | COMMUNITY
Start: 2020-09-22 | End: 2022-01-12

## 2022-01-12 RX ORDER — ESCITALOPRAM OXALATE 10 MG/1
10 TABLET ORAL
Qty: 30 | Refills: 0 | Status: DISCONTINUED | COMMUNITY
Start: 2020-09-22 | End: 2022-01-12

## 2022-01-12 RX ORDER — MAGNESIUM HYDROXIDE 400 MG/5ML
400 SUSPENSION, ORAL (FINAL DOSE FORM) ORAL DAILY
Qty: 1 | Refills: 0 | Status: ACTIVE | COMMUNITY
Start: 2022-01-12 | End: 1900-01-01

## 2022-01-12 RX ORDER — CLINDAMYCIN PHOSPHATE 1 G/10ML
1 GEL TOPICAL
Qty: 30 | Refills: 0 | Status: DISCONTINUED | COMMUNITY
Start: 2020-09-04 | End: 2022-01-12

## 2022-01-12 RX ORDER — LEVETIRACETAM 500 MG/1
500 TABLET, FILM COATED ORAL
Qty: 14 | Refills: 0 | Status: DISCONTINUED | COMMUNITY
Start: 2021-02-04 | End: 2022-01-12

## 2022-01-12 RX ORDER — LEVETIRACETAM 750 MG/1
750 TABLET, FILM COATED ORAL
Qty: 60 | Refills: 0 | Status: DISCONTINUED | COMMUNITY
Start: 2020-09-01 | End: 2022-01-12

## 2022-01-12 RX ORDER — BACLOFEN 20 MG/1
20 TABLET ORAL
Qty: 30 | Refills: 0 | Status: DISCONTINUED | COMMUNITY
Start: 2020-09-08 | End: 2022-01-12

## 2022-01-12 RX ORDER — TRAZODONE HYDROCHLORIDE 50 MG/1
50 TABLET ORAL
Qty: 30 | Refills: 0 | Status: DISCONTINUED | COMMUNITY
Start: 2020-09-25 | End: 2022-01-12

## 2022-01-12 RX ORDER — PROPRANOLOL HYDROCHLORIDE 10 MG/1
10 TABLET ORAL
Qty: 30 | Refills: 0 | Status: DISCONTINUED | COMMUNITY
Start: 2020-09-01 | End: 2022-01-12

## 2022-02-10 ENCOUNTER — NON-APPOINTMENT (OUTPATIENT)
Age: 32
End: 2022-02-10

## 2022-03-22 DIAGNOSIS — R47.1 DYSARTHRIA AND ANARTHRIA: ICD-10-CM

## 2022-04-04 ENCOUNTER — APPOINTMENT (OUTPATIENT)
Dept: NEUROLOGY | Facility: CLINIC | Age: 32
End: 2022-04-04

## 2022-04-27 ENCOUNTER — APPOINTMENT (OUTPATIENT)
Dept: PHYSICAL MEDICINE AND REHAB | Facility: CLINIC | Age: 32
End: 2022-04-27
Payer: MEDICAID

## 2022-04-27 VITALS
OXYGEN SATURATION: 97 % | SYSTOLIC BLOOD PRESSURE: 118 MMHG | HEART RATE: 83 BPM | HEIGHT: 69 IN | BODY MASS INDEX: 25.92 KG/M2 | RESPIRATION RATE: 16 BRPM | WEIGHT: 175 LBS | DIASTOLIC BLOOD PRESSURE: 80 MMHG | TEMPERATURE: 97.6 F

## 2022-04-27 PROCEDURE — 64644 CHEMODENERV 1 EXTREM 5/> MUS: CPT

## 2022-04-27 PROCEDURE — 95874 GUIDE NERV DESTR NEEDLE EMG: CPT

## 2022-04-27 PROCEDURE — 64643 CHEMODENERV 1 EXTREM 1-4 EA: CPT

## 2022-05-25 ENCOUNTER — APPOINTMENT (OUTPATIENT)
Dept: PHYSICAL MEDICINE AND REHAB | Facility: CLINIC | Age: 32
End: 2022-05-25
Payer: MEDICAID

## 2022-05-25 DIAGNOSIS — F32.3 MAJOR DEPRESSIVE DISORDER, SINGLE EPISODE, SEVERE WITH PSYCHOTIC FEATURES: ICD-10-CM

## 2022-05-25 DIAGNOSIS — R41.3 OTHER AMNESIA: ICD-10-CM

## 2022-05-25 PROCEDURE — 99214 OFFICE O/P EST MOD 30 MIN: CPT

## 2022-06-02 NOTE — REVIEW OF SYSTEMS
[Muscle Weakness] : muscle weakness [Difficulty Walking] : difficulty walking [Anxiety] : anxiety [Negative] : Heme/Lymph

## 2022-06-03 ENCOUNTER — NON-APPOINTMENT (OUTPATIENT)
Age: 32
End: 2022-06-03

## 2022-06-03 PROBLEM — F32.3 CURRENT SEVERE EPISODE OF MAJOR DEPRESSIVE DISORDER WITH PSYCHOTIC FEATURES WITHOUT PRIOR EPISODE: Status: ACTIVE | Noted: 2022-06-03

## 2022-06-03 RX ORDER — ONABOTULINUMTOXINA 100 [USP'U]/1
100 INJECTION, POWDER, LYOPHILIZED, FOR SOLUTION INTRADERMAL; INTRAMUSCULAR
Qty: 4 | Refills: 0 | Status: ACTIVE | OUTPATIENT
Start: 2022-06-03

## 2022-06-03 RX ORDER — QUETIAPINE FUMARATE 25 MG/1
25 TABLET ORAL
Qty: 30 | Refills: 3 | Status: ACTIVE | COMMUNITY
Start: 2022-06-03 | End: 1900-01-01
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
## 2022-06-03 NOTE — PHYSICAL EXAM
[FreeTextEntry1] : Neuro:\par  AAOx3,\par CN: mild dysarthria\par Motor-- right shoulder abduction 3-/5 posterior deltoid, elbow flexion- 2/5, extension 2/5, wrist 1/5, Finger flexion 4/5 Finger ext 0/5; right LE hip 3/5, knee extensors 5/5, flexors 2/5, PF/DF 1/5. \par Left UE and LE 5/5\par Sens impaired on right\par Coordination impaired on right, \par \par No pain with PROM of right shoulder\par \par Tone\par Elbow extensors, wrist flexors-- MAS 1+\par Elbow flexors and finger flexors: MAS 2\par Knee extensor: , MAS 1+\par Ankle PFs-- MAS 2\par \par gait: ambulating with Quad cane with CG--decreased weight shift to the right, left lean,  decreased knee flexion, fair foot clearance\par \par Psych:  in good spirits,  affect appropriate,

## 2022-06-03 NOTE — ASSESSMENT
[FreeTextEntry1] : --Next botox visit will use all 400 units botox and inject 40 units additional into gastrocsoleus to help improve foot clearance with ambulation. \par \par --Pt. appears to be at his baseline---  May resume therapy at [facility]\par \par --f/u with Psychiatry\par \par All questions answered.\par

## 2022-06-06 RX ORDER — RISPERIDONE 1 MG/1
1 TABLET, FILM COATED ORAL TWICE DAILY
Qty: 60 | Refills: 0 | Status: ACTIVE | COMMUNITY
Start: 2022-06-06 | End: 1900-01-01

## 2022-06-30 ENCOUNTER — APPOINTMENT (OUTPATIENT)
Dept: PHYSICAL MEDICINE AND REHAB | Facility: CLINIC | Age: 32
End: 2022-06-30

## 2022-06-30 VITALS
DIASTOLIC BLOOD PRESSURE: 82 MMHG | WEIGHT: 162 LBS | HEART RATE: 76 BPM | BODY MASS INDEX: 23.99 KG/M2 | OXYGEN SATURATION: 97 % | HEIGHT: 69 IN | TEMPERATURE: 98.4 F | SYSTOLIC BLOOD PRESSURE: 138 MMHG

## 2022-06-30 PROCEDURE — 99214 OFFICE O/P EST MOD 30 MIN: CPT

## 2022-06-30 RX ORDER — RISPERIDONE 0.25 MG/1
0.25 TABLET, FILM COATED ORAL
Qty: 30 | Refills: 1 | Status: ACTIVE | COMMUNITY
Start: 2022-06-30 | End: 1900-01-01
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
## 2022-07-01 NOTE — REVIEW OF SYSTEMS
[Muscle Weakness] : muscle weakness [Difficulty Walking] : difficulty walking [Insomnia] : insomnia [Negative] : Heme/Lymph [de-identified] : psychosis

## 2022-07-01 NOTE — ASSESSMENT
[FreeTextEntry1] : Psych-- Explained that pt. currently not stable to resume therapy given current psych issue.  Discussed trialing Risperdal 0.25mg qhs for his symptoms and help insomnia.  Discussed dose may need to be increased if not effective.  Pt. and sister advised to contact me if he has any side effects or not tolerating medication.  \par --follow up with Psychiatry for medication management-- rec. to have psychiatry contact me as well to coordinate care\par -- Discussed that psychological counseling will be beneficial to pt.  Requested Meeta inquire about this at [facility] psychiatry clinic or ask pt's insurance for referral.  \par --Office trying to obtain records from [facility] psychiatry office-- Records release signed. \par \par Will resume therapy at [facility] once pt. is stable\par \par -- Rx for OT wheelchair clinic for WC tire repair.  \par \par All questions answered.\par \par \par

## 2022-07-01 NOTE — PHYSICAL EXAM
[FreeTextEntry1] : Neuro:\par  AAOx3,\par CN: mild dysarthria\par Motor-- right shoulder abduction 3-/5 posterior deltoid, elbow flexion- 2/5, extension 2/5, wrist 1/5, Finger flexion 4/5 Finger ext 0/5; right LE hip 3/5, knee extensors 5/5, flexors 2/5, PF/DF 1/5. \par Left UE and LE 5/5\par Sens impaired on right\par Coordination impaired on right, \par \par No pain with PROM of right shoulder\par \par Tone\par Elbow extensors, wrist flexors-- MAS 1+\par Elbow flexors and finger flexors: MAS 2\par Knee extensor: , MAS 1+\par Ankle PFs-- MAS 2\par \par \par Psych: flat affect\par \par

## 2022-07-06 ENCOUNTER — NON-APPOINTMENT (OUTPATIENT)
Age: 32
End: 2022-07-06

## 2022-07-06 RX ORDER — RISPERIDONE 1 MG/1
1 TABLET, FILM COATED ORAL
Qty: 30 | Refills: 0 | Status: ACTIVE | COMMUNITY
Start: 2022-07-06 | End: 1900-01-01

## 2022-07-13 ENCOUNTER — APPOINTMENT (OUTPATIENT)
Dept: INTERNAL MEDICINE | Facility: CLINIC | Age: 32
End: 2022-07-13

## 2022-08-18 ENCOUNTER — APPOINTMENT (OUTPATIENT)
Dept: PHYSICAL MEDICINE AND REHAB | Facility: CLINIC | Age: 32
End: 2022-08-18

## 2022-08-18 VITALS
SYSTOLIC BLOOD PRESSURE: 110 MMHG | DIASTOLIC BLOOD PRESSURE: 74 MMHG | TEMPERATURE: 97.7 F | HEIGHT: 69 IN | HEART RATE: 87 BPM | OXYGEN SATURATION: 97 %

## 2022-08-18 PROCEDURE — 95874 GUIDE NERV DESTR NEEDLE EMG: CPT

## 2022-08-18 PROCEDURE — 64643 CHEMODENERV 1 EXTREM 1-4 EA: CPT

## 2022-08-18 PROCEDURE — 99213 OFFICE O/P EST LOW 20 MIN: CPT | Mod: 25

## 2022-08-18 PROCEDURE — 64644 CHEMODENERV 1 EXTREM 5/> MUS: CPT

## 2022-08-19 NOTE — PROCEDURE
[Consent] : consent was given by patient or guardian [Site Verification] : the injection site was verified [Post-Injection Instructions Provided] : post-injection instructions were provided [] : EMG Guidance was used during the procedure [Total Units: ___] : [unfilled] units [Total Vials: ___] : [unfilled] vials were used [Total Waste: ___] : with [unfilled] wasted [de-identified] : Botox Lot # [id number]\par Saline Lot # [id number] [TWNoteComboBox1] : Biceps [TWNoteComboBox2] : 60 Units [de-identified] : Brachioradialis [de-identified] : 30 Units [de-identified] : Brachioradialis [de-identified] : 50 Units [de-identified] : Flexor Carpi Radialis [de-identified] : 20 Units [de-identified] : Flexor Digitorum Superficialis [de-identified] : 50 Units [de-identified] : Medial Gastrocnemius [de-identified] : 40 Units [de-identified] : Soleus [de-identified] : 50 Units

## 2022-08-19 NOTE — PHYSICAL EXAM
[FreeTextEntry1] : Neuro:\par  AAOx3,\par CN: mild dysarthria\par Motor-- right shoulder abduction 4-/5 , elbow flexion- 2/5, extension 3/5, wrist 3-/5, Finger flexion 4/5 Finger ext 0/5; right LE hip 3/5, knee extensors 5/5, flexors 2/5, PF 2/5; DF 1/5. \par Left UE and LE 5/5\par Sens impaired on right\par Coordination impaired on right, \par \par No pain with PROM of right shoulder\par \par Tone\par Elbow flexors and finger flexors: MAS 2\par Wrist flexors- 1+\par Ankle PFs-- MAS 2-3\par \par \par GAit:  Ambulates with quad cane, leans to left slightly, fair foot clearance, right UE flexed to 90deg. decreased eccentric control of right quad\par \par .
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
## 2022-08-19 NOTE — ASSESSMENT
[FreeTextEntry1] : Discussed how botox would be helpful to pt.  Dose was reduced to 300 units as pt's tone is improved.  \par \par Tolerated botox injections well.\par \par PT and OT Rx provided. \par \par All questions answered.\par

## 2022-09-15 ENCOUNTER — APPOINTMENT (OUTPATIENT)
Dept: PHYSICAL MEDICINE AND REHAB | Facility: CLINIC | Age: 32
End: 2022-09-15

## 2022-09-19 NOTE — PLAN
Called pt to notify that we cannot fill form and it had to be her ortho provider. Pt states that she wanted the forms faxed to [facility] OT.     Keeping open for pt to call back with requested fax number.  
[FreeTextEntry1] : Constipation\par - encourage 1-2 kiwis daily\par - trial of MOM - reviewed how to take it\par \par Anxiety- continue lexapro 10mg daily\par \par Hemiparesis / Spasticity- close f/up with OT, PT PMR botox inj.\par \par Pain management- tylenol prn   botox.\par \par Fatigue - get labs from 2 weeks ago for review - \par Medical record release form completed\par

## 2022-09-21 ENCOUNTER — APPOINTMENT (OUTPATIENT)
Dept: INTERNAL MEDICINE | Facility: CLINIC | Age: 32
End: 2022-09-21

## 2022-09-21 ENCOUNTER — APPOINTMENT (OUTPATIENT)
Dept: PHYSICAL MEDICINE AND REHAB | Facility: CLINIC | Age: 32
End: 2022-09-21

## 2022-10-24 ENCOUNTER — APPOINTMENT (OUTPATIENT)
Dept: INTERNAL MEDICINE | Facility: CLINIC | Age: 32
End: 2022-10-24

## 2022-10-24 VITALS
TEMPERATURE: 98.2 F | HEART RATE: 58 BPM | RESPIRATION RATE: 16 BRPM | OXYGEN SATURATION: 98 % | BODY MASS INDEX: 28.7 KG/M2 | HEIGHT: 63 IN | SYSTOLIC BLOOD PRESSURE: 110 MMHG | DIASTOLIC BLOOD PRESSURE: 72 MMHG | WEIGHT: 162 LBS

## 2022-10-24 DIAGNOSIS — Z00.00 ENCOUNTER FOR GENERAL ADULT MEDICAL EXAMINATION W/OUT ABNORMAL FINDINGS: ICD-10-CM

## 2022-10-24 PROCEDURE — 36415 COLL VENOUS BLD VENIPUNCTURE: CPT

## 2022-10-24 PROCEDURE — 99395 PREV VISIT EST AGE 18-39: CPT

## 2022-10-24 RX ORDER — ESCITALOPRAM OXALATE 10 MG/1
10 TABLET ORAL
Qty: 30 | Refills: 1 | Status: DISCONTINUED | COMMUNITY
Start: 2021-12-29 | End: 2022-10-24
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
## 2022-10-24 NOTE — PLAN
[FreeTextEntry1] : Constipation\par \par Anxiety- encourage seeeing a therpist again\par - reviewed use of medical marijuana and how to get a [place] state license,   rec going to a licensed practitioner for thie\par \par Hemiparesis / Spasticity- close f/up with OT, PT PMR botox inj.\par \par Pain management- tylenol prn   botox.\par \par labs today\par

## 2022-10-25 LAB
ALBUMIN SERPL ELPH-MCNC: 4.7 G/DL
ALP BLD-CCNC: 84 U/L
ALT SERPL-CCNC: 9 U/L
ANION GAP SERPL CALC-SCNC: 10 MMOL/L
AST SERPL-CCNC: 16 U/L
BASOPHILS # BLD AUTO: 0.05 K/UL
BASOPHILS NFR BLD AUTO: 1.2 %
BILIRUB SERPL-MCNC: 0.7 MG/DL
BUN SERPL-MCNC: 11 MG/DL
CALCIUM SERPL-MCNC: 9.7 MG/DL
CHLORIDE SERPL-SCNC: 100 MMOL/L
CHOLEST SERPL-MCNC: 195 MG/DL
CO2 SERPL-SCNC: 27 MMOL/L
CREAT SERPL-MCNC: 0.83 MG/DL
EGFR: 120 ML/MIN/1.73M2
EOSINOPHIL # BLD AUTO: 0.04 K/UL
EOSINOPHIL NFR BLD AUTO: 1 %
ESTIMATED AVERAGE GLUCOSE: 85 MG/DL
GLUCOSE SERPL-MCNC: 84 MG/DL
HBA1C MFR BLD HPLC: 4.6 %
HCT VFR BLD CALC: 41.8 %
HDLC SERPL-MCNC: 58 MG/DL
HGB BLD-MCNC: 14.4 G/DL
IMM GRANULOCYTES NFR BLD AUTO: 0 %
LDLC SERPL CALC-MCNC: 127 MG/DL
LYMPHOCYTES # BLD AUTO: 1.56 K/UL
LYMPHOCYTES NFR BLD AUTO: 37.3 %
MAN DIFF?: NORMAL
MCHC RBC-ENTMCNC: 34.1 PG
MCHC RBC-ENTMCNC: 34.4 GM/DL
MCV RBC AUTO: 99.1 FL
MONOCYTES # BLD AUTO: 0.26 K/UL
MONOCYTES NFR BLD AUTO: 6.2 %
NEUTROPHILS # BLD AUTO: 2.27 K/UL
NEUTROPHILS NFR BLD AUTO: 54.3 %
NONHDLC SERPL-MCNC: 136 MG/DL
PLATELET # BLD AUTO: 237 K/UL
POTASSIUM SERPL-SCNC: 4 MMOL/L
PROT SERPL-MCNC: 7.6 G/DL
RBC # BLD: 4.22 M/UL
RBC # FLD: 11.7 %
SODIUM SERPL-SCNC: 137 MMOL/L
TRIGL SERPL-MCNC: 46 MG/DL
WBC # FLD AUTO: 4.18 K/UL

## 2022-11-09 NOTE — DISCHARGE NOTE PROVIDER - CARE PROVIDERS DIRECT ADDRESSES
[email or web address] [email or web address]

## 2022-11-22 ENCOUNTER — APPOINTMENT (OUTPATIENT)
Dept: PHYSICAL MEDICINE AND REHAB | Facility: CLINIC | Age: 32
End: 2022-11-22

## 2023-08-08 ENCOUNTER — APPOINTMENT (OUTPATIENT)
Dept: PHYSICAL MEDICINE AND REHAB | Facility: CLINIC | Age: 33
End: 2023-08-08
Payer: MEDICAID

## 2023-08-08 ENCOUNTER — NON-APPOINTMENT (OUTPATIENT)
Age: 33
End: 2023-08-08

## 2023-08-08 VITALS
BODY MASS INDEX: 29.23 KG/M2 | WEIGHT: 165 LBS | SYSTOLIC BLOOD PRESSURE: 128 MMHG | OXYGEN SATURATION: 98 % | TEMPERATURE: 97.2 F | HEART RATE: 68 BPM | DIASTOLIC BLOOD PRESSURE: 78 MMHG | HEIGHT: 63 IN

## 2023-08-08 DIAGNOSIS — G81.91 HEMIPLEGIA, UNSPECIFIED AFFECTING RIGHT DOMINANT SIDE: ICD-10-CM

## 2023-08-08 DIAGNOSIS — R26.89 OTHER ABNORMALITIES OF GAIT AND MOBILITY: ICD-10-CM

## 2023-08-08 DIAGNOSIS — R41.89 OTHER SYMPTOMS AND SIGNS INVOLVING COGNITIVE FUNCTIONS AND AWARENESS: ICD-10-CM

## 2023-08-08 DIAGNOSIS — G81.94 HEMIPLEGIA, UNSPECIFIED AFFECTING LEFT NONDOMINANT SIDE: ICD-10-CM

## 2023-08-08 DIAGNOSIS — F29 UNSPECIFIED PSYCHOSIS NOT DUE TO A SUBSTANCE OR KNOWN PHYSIOLOGICAL CONDITION: ICD-10-CM

## 2023-08-08 DIAGNOSIS — S06.9XAA UNSPECIFIED INTRACRANIAL INJURY WITH LOSS OF CONSCIOUSNESS STATUS UNKNOWN, INITIAL ENCOUNTER: ICD-10-CM

## 2023-08-08 DIAGNOSIS — R25.2 CRAMP AND SPASM: ICD-10-CM

## 2023-08-08 PROCEDURE — 99214 OFFICE O/P EST MOD 30 MIN: CPT

## 2023-08-08 RX ORDER — RISPERIDONE 1 MG/1
1 TABLET, FILM COATED ORAL
Qty: 30 | Refills: 1 | Status: ACTIVE | COMMUNITY
Start: 2023-08-08 | End: 1900-01-01

## 2023-08-14 NOTE — END OF VISIT
[] : Fellow [FreeTextEntry3] :  Patient seen with fellow.  Agree with documentation as above. amended where appropriate.

## 2023-08-14 NOTE — ASSESSMENT
[FreeTextEntry1] : -Referral to Psychiatry--explained to patient the importance of maintaining psychiatry follow-up.  Spoke to patient's Sister Meeta via phone and discussed this as well.  She will assist patient in setting up appointments. -Refilled Risperidone 1mg at night   -Referral to PT/OT/SLP at [facility].    -Referral to Neuropsychology for counseling  All questions answered.
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
## 2023-11-28 ENCOUNTER — APPOINTMENT (OUTPATIENT)
Dept: FAMILY MEDICINE | Facility: CLINIC | Age: 33
End: 2023-11-28